# Patient Record
Sex: MALE | Race: WHITE | NOT HISPANIC OR LATINO | Employment: OTHER | URBAN - METROPOLITAN AREA
[De-identification: names, ages, dates, MRNs, and addresses within clinical notes are randomized per-mention and may not be internally consistent; named-entity substitution may affect disease eponyms.]

---

## 2019-02-06 ENCOUNTER — HOSPITAL ENCOUNTER (EMERGENCY)
Facility: CLINIC | Age: 69
Discharge: ANOTHER HOSPITAL | End: 2019-02-06
Attending: EMERGENCY MEDICINE | Admitting: EMERGENCY MEDICINE
Payer: MEDICARE

## 2019-02-06 ENCOUNTER — APPOINTMENT (OUTPATIENT)
Dept: CT IMAGING | Facility: CLINIC | Age: 69
End: 2019-02-06
Attending: EMERGENCY MEDICINE
Payer: MEDICARE

## 2019-02-06 ENCOUNTER — HOSPITAL ENCOUNTER (OUTPATIENT)
Facility: CLINIC | Age: 69
Setting detail: OBSERVATION
Discharge: HOME | End: 2019-02-07
Attending: EMERGENCY MEDICINE | Admitting: SURGERY
Payer: MEDICARE

## 2019-02-06 ENCOUNTER — APPOINTMENT (OUTPATIENT)
Dept: OCCUPATIONAL THERAPY | Facility: CLINIC | Age: 69
End: 2019-02-06
Attending: NURSE PRACTITIONER
Payer: MEDICARE

## 2019-02-06 VITALS
HEART RATE: 69 BPM | OXYGEN SATURATION: 93 % | TEMPERATURE: 98.6 F | DIASTOLIC BLOOD PRESSURE: 99 MMHG | SYSTOLIC BLOOD PRESSURE: 147 MMHG | RESPIRATION RATE: 187 BRPM

## 2019-02-06 DIAGNOSIS — M54.50 ACUTE MIDLINE LOW BACK PAIN WITHOUT SCIATICA: ICD-10-CM

## 2019-02-06 DIAGNOSIS — S22.069A CLOSED FRACTURE OF EIGHTH THORACIC VERTEBRA, UNSPECIFIED FRACTURE MORPHOLOGY, INITIAL ENCOUNTER (H): ICD-10-CM
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
## 2019-02-06 PROBLEM — M54.9 BACK PAIN: Status: ACTIVE | Noted: 2019-02-06

## 2019-02-06 LAB
ANION GAP SERPL CALCULATED.3IONS-SCNC: 4 MMOL/L (ref 3–14)
BUN SERPL-MCNC: 20 MG/DL (ref 7–30)
CALCIUM SERPL-MCNC: 8.5 MG/DL (ref 8.5–10.1)
CHLORIDE SERPL-SCNC: 110 MMOL/L (ref 94–109)
CO2 SERPL-SCNC: 29 MMOL/L (ref 20–32)
CREAT SERPL-MCNC: 0.9 MG/DL (ref 0.66–1.25)
ERYTHROCYTE [DISTWIDTH] IN BLOOD BY AUTOMATED COUNT: 14.2 % (ref 10–15)
GFR SERPL CREATININE-BSD FRML MDRD: 88 ML/MIN/{1.73_M2}
GLUCOSE SERPL-MCNC: 96 MG/DL (ref 70–99)
HCT VFR BLD AUTO: 43.4 % (ref 40–53)
HGB BLD-MCNC: 14.2 G/DL (ref 13.3–17.7)
MCH RBC QN AUTO: 33.6 PG (ref 26.5–33)
MCHC RBC AUTO-ENTMCNC: 32.7 G/DL (ref 31.5–36.5)
MCV RBC AUTO: 103 FL (ref 78–100)
PLATELET # BLD AUTO: 306 10E9/L (ref 150–450)
POTASSIUM SERPL-SCNC: 3.9 MMOL/L (ref 3.4–5.3)
RBC # BLD AUTO: 4.22 10E12/L (ref 4.4–5.9)
SODIUM SERPL-SCNC: 143 MMOL/L (ref 133–144)
WBC # BLD AUTO: 7.8 10E9/L (ref 4–11)

## 2019-02-06 PROCEDURE — 25000128 H RX IP 250 OP 636: Performed by: EMERGENCY MEDICINE

## 2019-02-06 PROCEDURE — 96376 TX/PRO/DX INJ SAME DRUG ADON: CPT

## 2019-02-06 PROCEDURE — 97535 SELF CARE MNGMENT TRAINING: CPT | Mod: GO,XU | Performed by: OCCUPATIONAL THERAPIST

## 2019-02-06 PROCEDURE — 72125 CT NECK SPINE W/O DYE: CPT

## 2019-02-06 PROCEDURE — 72128 CT CHEST SPINE W/O DYE: CPT

## 2019-02-06 PROCEDURE — 97530 THERAPEUTIC ACTIVITIES: CPT | Mod: GO | Performed by: OCCUPATIONAL THERAPIST

## 2019-02-06 PROCEDURE — 25000132 ZZH RX MED GY IP 250 OP 250 PS 637: Mod: GY | Performed by: NURSE PRACTITIONER

## 2019-02-06 PROCEDURE — A9270 NON-COVERED ITEM OR SERVICE: HCPCS | Mod: GY | Performed by: NURSE PRACTITIONER

## 2019-02-06 PROCEDURE — 99285 EMERGENCY DEPT VISIT HI MDM: CPT | Mod: 25

## 2019-02-06 PROCEDURE — G0378 HOSPITAL OBSERVATION PER HR: HCPCS

## 2019-02-06 PROCEDURE — 99285 EMERGENCY DEPT VISIT HI MDM: CPT | Mod: Z6 | Performed by: EMERGENCY MEDICINE

## 2019-02-06 PROCEDURE — A9270 NON-COVERED ITEM OR SERVICE: HCPCS | Mod: GY | Performed by: STUDENT IN AN ORGANIZED HEALTH CARE EDUCATION/TRAINING PROGRAM

## 2019-02-06 PROCEDURE — 99285 EMERGENCY DEPT VISIT HI MDM: CPT | Mod: 25 | Performed by: EMERGENCY MEDICINE

## 2019-02-06 PROCEDURE — 99222 1ST HOSP IP/OBS MODERATE 55: CPT | Performed by: NURSE PRACTITIONER

## 2019-02-06 PROCEDURE — 25000132 ZZH RX MED GY IP 250 OP 250 PS 637: Mod: GY | Performed by: STUDENT IN AN ORGANIZED HEALTH CARE EDUCATION/TRAINING PROGRAM

## 2019-02-06 PROCEDURE — 96375 TX/PRO/DX INJ NEW DRUG ADDON: CPT | Mod: 59

## 2019-02-06 PROCEDURE — 97165 OT EVAL LOW COMPLEX 30 MIN: CPT | Mod: GO | Performed by: OCCUPATIONAL THERAPIST

## 2019-02-06 PROCEDURE — 22310 CLOSED TX VERT FX W/O MANJ: CPT

## 2019-02-06 PROCEDURE — 85027 COMPLETE CBC AUTOMATED: CPT | Performed by: NURSE PRACTITIONER

## 2019-02-06 PROCEDURE — 40000133 ZZH STATISTIC OT WARD VISIT: Performed by: OCCUPATIONAL THERAPIST

## 2019-02-06 PROCEDURE — 96374 THER/PROPH/DIAG INJ IV PUSH: CPT | Performed by: EMERGENCY MEDICINE

## 2019-02-06 PROCEDURE — 72131 CT LUMBAR SPINE W/O DYE: CPT

## 2019-02-06 PROCEDURE — 96374 THER/PROPH/DIAG INJ IV PUSH: CPT | Mod: 59

## 2019-02-06 PROCEDURE — 80048 BASIC METABOLIC PNL TOTAL CA: CPT | Performed by: NURSE PRACTITIONER

## 2019-02-06 PROCEDURE — 68200002 ZZH TRAUMA EVALUATION W/O CC LEVEL II: Performed by: EMERGENCY MEDICINE

## 2019-02-06 RX ORDER — LIDOCAINE 4 G/G
2 PATCH TOPICAL
Status: DISCONTINUED | OUTPATIENT
Start: 2019-02-06 | End: 2019-02-07 | Stop reason: HOSPADM

## 2019-02-06 RX ORDER — PANTOPRAZOLE SODIUM 40 MG/1
40 TABLET, DELAYED RELEASE ORAL DAILY
Status: DISCONTINUED | OUTPATIENT
Start: 2019-02-06 | End: 2019-02-07 | Stop reason: HOSPADM

## 2019-02-06 RX ORDER — NALOXONE HYDROCHLORIDE 0.4 MG/ML
.1-.4 INJECTION, SOLUTION INTRAMUSCULAR; INTRAVENOUS; SUBCUTANEOUS
Status: DISCONTINUED | OUTPATIENT
Start: 2019-02-06 | End: 2019-02-07 | Stop reason: HOSPADM

## 2019-02-06 RX ORDER — LIDOCAINE 40 MG/G
CREAM TOPICAL
Status: DISCONTINUED | OUTPATIENT
Start: 2019-02-06 | End: 2019-02-07 | Stop reason: HOSPADM

## 2019-02-06 RX ORDER — PROPRANOLOL HYDROCHLORIDE 40 MG/1
40 TABLET ORAL DAILY
COMMUNITY
Start: 2017-09-29

## 2019-02-06 RX ORDER — ONDANSETRON 4 MG/1
4 TABLET, ORALLY DISINTEGRATING ORAL EVERY 6 HOURS PRN
Status: DISCONTINUED | OUTPATIENT
Start: 2019-02-06 | End: 2019-02-07 | Stop reason: HOSPADM

## 2019-02-06 RX ORDER — ACETAMINOPHEN 650 MG/1
650 SUPPOSITORY RECTAL EVERY 4 HOURS PRN
Status: DISCONTINUED | OUTPATIENT
Start: 2019-02-06 | End: 2019-02-06

## 2019-02-06 RX ORDER — PANTOPRAZOLE SODIUM 40 MG/1
40 TABLET, DELAYED RELEASE ORAL DAILY
Status: ON HOLD | COMMUNITY
Start: 2018-10-30 | End: 2019-12-13

## 2019-02-06 RX ORDER — SIMVASTATIN 40 MG
40 TABLET ORAL AT BEDTIME
Status: DISCONTINUED | OUTPATIENT
Start: 2019-02-06 | End: 2019-02-07 | Stop reason: HOSPADM

## 2019-02-06 RX ORDER — METHOCARBAMOL 500 MG/1
500 TABLET, FILM COATED ORAL EVERY 6 HOURS
Status: DISCONTINUED | OUTPATIENT
Start: 2019-02-06 | End: 2019-02-07

## 2019-02-06 RX ORDER — KETOROLAC TROMETHAMINE 15 MG/ML
15 INJECTION, SOLUTION INTRAMUSCULAR; INTRAVENOUS ONCE
Status: COMPLETED | OUTPATIENT
Start: 2019-02-06 | End: 2019-02-06

## 2019-02-06 RX ORDER — SIMVASTATIN 40 MG
40 TABLET ORAL AT BEDTIME
COMMUNITY
Start: 2017-09-29 | End: 2024-08-05

## 2019-02-06 RX ORDER — OXYCODONE HYDROCHLORIDE 5 MG/1
5-10 TABLET ORAL EVERY 4 HOURS PRN
Status: DISCONTINUED | OUTPATIENT
Start: 2019-02-06 | End: 2019-02-07 | Stop reason: HOSPADM

## 2019-02-06 RX ORDER — ONDANSETRON 2 MG/ML
4 INJECTION INTRAMUSCULAR; INTRAVENOUS EVERY 6 HOURS PRN
Status: DISCONTINUED | OUTPATIENT
Start: 2019-02-06 | End: 2019-02-07 | Stop reason: HOSPADM

## 2019-02-06 RX ORDER — FENTANYL CITRATE 50 UG/ML
100 INJECTION, SOLUTION INTRAMUSCULAR; INTRAVENOUS ONCE
Status: COMPLETED | OUTPATIENT
Start: 2019-02-06 | End: 2019-02-06

## 2019-02-06 RX ORDER — LORAZEPAM 2 MG/ML
1 INJECTION INTRAMUSCULAR ONCE
Status: COMPLETED | OUTPATIENT
Start: 2019-02-06 | End: 2019-02-06

## 2019-02-06 RX ORDER — ALLOPURINOL 300 MG/1
300 TABLET ORAL
COMMUNITY
Start: 2017-09-29 | End: 2019-02-06

## 2019-02-06 RX ORDER — ACETAMINOPHEN 325 MG/1
650 TABLET ORAL EVERY 4 HOURS PRN
Status: DISCONTINUED | OUTPATIENT
Start: 2019-02-06 | End: 2019-02-06

## 2019-02-06 RX ORDER — PROPRANOLOL HYDROCHLORIDE 10 MG/1
40 TABLET ORAL DAILY
Status: DISCONTINUED | OUTPATIENT
Start: 2019-02-06 | End: 2019-02-07 | Stop reason: HOSPADM

## 2019-02-06 RX ORDER — OXYCODONE HYDROCHLORIDE 5 MG/1
5 TABLET ORAL EVERY 4 HOURS PRN
Status: DISCONTINUED | OUTPATIENT
Start: 2019-02-06 | End: 2019-02-06

## 2019-02-06 RX ORDER — ACETAMINOPHEN 325 MG/1
975 TABLET ORAL EVERY 6 HOURS
Status: DISCONTINUED | OUTPATIENT
Start: 2019-02-06 | End: 2019-02-07 | Stop reason: HOSPADM

## 2019-02-06 RX ORDER — NAPROXEN SODIUM 220 MG
220 TABLET ORAL PRN
Status: ON HOLD | COMMUNITY
End: 2019-02-07

## 2019-02-06 RX ORDER — IBUPROFEN 200 MG
200 TABLET ORAL EVERY 6 HOURS PRN
COMMUNITY

## 2019-02-06 RX ADMIN — OXYCODONE HYDROCHLORIDE 5 MG: 5 TABLET ORAL at 23:28

## 2019-02-06 RX ADMIN — KETOROLAC TROMETHAMINE 15 MG: 15 INJECTION, SOLUTION INTRAMUSCULAR; INTRAVENOUS at 10:45

## 2019-02-06 RX ADMIN — LORAZEPAM 1 MG: 2 INJECTION INTRAMUSCULAR; INTRAVENOUS at 02:17

## 2019-02-06 RX ADMIN — OXYCODONE HYDROCHLORIDE 5 MG: 5 TABLET ORAL at 12:47

## 2019-02-06 RX ADMIN — HYDROMORPHONE HYDROCHLORIDE 1 MG: 1 INJECTION, SOLUTION INTRAMUSCULAR; INTRAVENOUS; SUBCUTANEOUS at 05:32

## 2019-02-06 RX ADMIN — HYDROMORPHONE HYDROCHLORIDE 1 MG: 1 INJECTION, SOLUTION INTRAMUSCULAR; INTRAVENOUS; SUBCUTANEOUS at 04:34

## 2019-02-06 RX ADMIN — OXYCODONE HYDROCHLORIDE 5 MG: 5 TABLET ORAL at 17:08

## 2019-02-06 RX ADMIN — OXYCODONE HYDROCHLORIDE 5 MG: 5 TABLET ORAL at 21:55

## 2019-02-06 RX ADMIN — METHOCARBAMOL 500 MG: 500 TABLET, FILM COATED ORAL at 17:08

## 2019-02-06 RX ADMIN — PANTOPRAZOLE SODIUM 40 MG: 40 TABLET, DELAYED RELEASE ORAL at 14:06

## 2019-02-06 RX ADMIN — METHOCARBAMOL 500 MG: 500 TABLET, FILM COATED ORAL at 11:55

## 2019-02-06 RX ADMIN — ACETAMINOPHEN 975 MG: 325 TABLET, FILM COATED ORAL at 17:08

## 2019-02-06 RX ADMIN — LIDOCAINE 2 PATCH: 560 PATCH PERCUTANEOUS; TOPICAL; TRANSDERMAL at 11:56

## 2019-02-06 RX ADMIN — ACETAMINOPHEN 975 MG: 325 TABLET, FILM COATED ORAL at 23:28

## 2019-02-06 RX ADMIN — PROPRANOLOL HYDROCHLORIDE 40 MG: 10 TABLET ORAL at 14:06

## 2019-02-06 RX ADMIN — FENTANYL CITRATE 100 MCG: 50 INJECTION, SOLUTION INTRAMUSCULAR; INTRAVENOUS at 05:53

## 2019-02-06 RX ADMIN — HYDROMORPHONE HYDROCHLORIDE 1 MG: 1 INJECTION, SOLUTION INTRAMUSCULAR; INTRAVENOUS; SUBCUTANEOUS at 00:53

## 2019-02-06 RX ADMIN — ACETAMINOPHEN 975 MG: 325 TABLET, FILM COATED ORAL at 11:54

## 2019-02-06 RX ADMIN — METHOCARBAMOL 500 MG: 500 TABLET, FILM COATED ORAL at 23:28

## 2019-02-06 RX ADMIN — SIMVASTATIN 40 MG: 40 TABLET, FILM COATED ORAL at 21:55

## 2019-02-06 ASSESSMENT — ENCOUNTER SYMPTOMS
CONFUSION: 0
HEADACHES: 0
BACK PAIN: 1
ABDOMINAL PAIN: 0
NECK PAIN: 1
NECK STIFFNESS: 0
DIFFICULTY URINATING: 0
VOMITING: 0
ARTHRALGIAS: 0
NAUSEA: 0
FEVER: 0
BACK PAIN: 1
EYE REDNESS: 0
COLOR CHANGE: 0
SHORTNESS OF BREATH: 0
NECK PAIN: 1

## 2019-02-06 ASSESSMENT — MIFFLIN-ST. JEOR: SCORE: 1763.29

## 2019-02-06 NOTE — PHARMACY-ADMISSION MEDICATION HISTORY
"Admission medication history interview status for the 2/6/2019 admission is complete. See Epic admission navigator for allergy information, pharmacy, prior to admission medications and immunization status.     Medication history interview sources:  Patient, Care Everywhere    Changes made to PTA medication list (reason)  Added: none  Deleted: allopurinol 300 mg tablets (patient not taking), lansoprazole PO (patient reports he is taking pantoprazole), phenol 1.4% spray (patient not using), duplicate propranolol order, duplicate simvastatin order  Changed: added frequency of \"daily\" to pantoprazole 40 mg tablets and propranolol 40 mg tablets, added frequency of \"at bedtime\" to simvastatin 40 mg tablets    Additional medication history information (including reliability of information, actions taken by pharmacist): Patient was a reliable historian. Reports that he is not taking any OTC, supplements/vitamins, or any other prescription medications than those listed below.     Patient states that he takes propranolol for tremor    Reports taking about 6 tablets of ibuprofen and 2 tablets of Aleve prior to arriving at [facility] ED. Normally takes as needed.     Prior to Admission medications    Medication Sig Last Dose Taking? Auth Provider   ibuprofen (ADVIL/MOTRIN) 200 MG tablet Take 200 mg by mouth every 6 hours as needed for mild pain 2/5/2019 at AM Yes Unknown, Entered By History   naproxen sodium (ANAPROX) 220 MG tablet Take 220 mg by mouth as needed for moderate pain 2/5/2019 at AM Yes Unknown, Entered By History   pantoprazole (PROTONIX) 40 MG EC tablet Take 40 mg by mouth daily  2/5/2019 at AM Yes Reported, Patient   propranolol (INDERAL) 40 MG tablet Take 40 mg by mouth daily  2/5/2019 at AM Yes Reported, Patient   simvastatin (ZOCOR) 40 MG tablet Take 40 mg by mouth At Bedtime  2/4/2019 at PM  Reported, Patient       Medication history completed by: Ronel Verma, PharmD    "

## 2019-02-06 NOTE — ED PROVIDER NOTES
History     Chief Complaint:  Fall    The history is provided by the patient.      Olayinka Baltazar is a 68 year old male with history of hyperlipidemia and spinal stenosis s/p lumbar laminectomy L3-4 who presents with back pain after slipping on ice about 10 hours ago. The patient states that when he slipped and fell, he landed on his back and hit hit head and neck on the ground as well. He denies LOC. He is not on blood thinners.  Since then, the patient reports he has been having extreme pain in his lower-mid back that exacerbates with movement. He states that the sides of his neck are tender as well, but denies midline cervical tenderness. He denies numbness or weakness to the extremities, and is able to ambulate.   The patient presented to clinic, where a neck Xray was obtained, and then he was prompted to present to the ED. He states that he took 2 aleve and 6 ibuprofen prior to arrival.    Allergies:  No known drug allergies      Medications:    Lansoprazole  Propranolol  Simvastatin     Past Medical History:    Tremors  Hyperlipidemia     Past Surgical History:    Splenectomy  Tonsillectomy  Orthopedic surgery     Family History:    CAD    Social History:  The patient is accompanied to the ED by wife  PCP: [facility]   Marital Status:    Smoking status: current every day smoker  Alcohol use: No- quit 2009      Review of Systems   Cardiovascular: Negative for chest pain.   Gastrointestinal: Negative for nausea and vomiting.   Musculoskeletal: Positive for back pain and neck pain.   All other systems reviewed and are negative.      Physical Exam     Patient Vitals for the past 24 hrs:   BP Temp Temp src Pulse Heart Rate Resp SpO2   02/06/19 0445 (!) 137/91 -- -- 64 -- -- 95 %   02/06/19 0430 (!) 146/122 -- -- 71 -- -- 96 %   02/06/19 0415 144/90 -- -- 62 -- -- 95 %   02/06/19 0400 (!) 157/100 -- -- 63 -- -- 95 %   02/06/19 0300 145/85 -- -- 61 -- -- 96 %   02/06/19 0200 143/87 -- --  60 -- -- 94 %   02/06/19 0122 -- 98.6  F (37  C) Oral -- -- -- --   02/06/19 0100 147/86 -- -- 79 -- -- 93 %   02/06/19 0039 (!) 146/97 -- -- -- 70 (!) 187 96 %        Physical Exam  Constitutional: vitals reviewed  Eyes: EOMI  ENT: No hemotympanum, dental trauma, or oropharyngeal lacerations  Respiratory: Normal effort, symmetric chest rise, breath sounds equal bilaterally  Cardiovascular: Distal pulses palpable and symmetric, distal extremities warm, heart rate normal and rhythm regular  Gastrointestinal: No tenderness to palpation or guarding  MSK: No tenderness of facial bones, no step offs of scalp, no chest wall tenderness or crepitus, no long bone deformities or tenderness, full ROM of all extremities.  Cervical collar is in place.  There is paraspinous tenderness of the cervical spine and thoracic spine.  Some midline tenderness of the low to mid thoracic spine without obvious step-offs.  Skin: No diaphoresis, lacerations, or lesions  Neurologic: Alert and oriented, follows commands in all extremities, sensation to light touch intact in all extremities, full strength and coordination        Emergency Department Course     Imaging:  Radiographic findings were communicated with the patient who voiced understanding of the findings.    Lumbar spine CT w/o contrast  Impression: No acute fracture or malalignment.  As read by Radiology.     Cervical spine CT w/o contrast  Impression: No acute traumatic abnormality.  As read by Radiology.     CT Thoracic Spine w/o contrast  Impression:   1. There are bridging osteophytes from T2 through L1  2. At the T8 level, there is a linear lucency through a ventral osteophyte with slight extension into the anterior aspect of the T8 vertebral body. This is compatible with age-indeterminate fracture.  As read by Radiology.     Interventions:  0053: dilaudid 1 mg, IV  0217: ativan 1 mg, IV  0434: dilaudid 1 mg, IV    Emergency Department Course:  Past medical records, nursing  notes, and vitals reviewed.  0041: I performed an exam of the patient and obtained history, as documented above.  The patient was sent for CT scan while in the emergency department, findings above.       0428: I rechecked the patient. Explained findings to the patient.     Findings and plan explained to the Patient.    Patient will be transferred to [facility] via EMS. Discussed the case with Dr. Avalos, who will admit the patient to a monitored bed for further monitoring, evaluation, and treatment.      Impression & Plan      Medical Decision Making:  Patient who presents with mechanical fall on ice backwards with acute back pain.  He was sent by his clinic with concerns for potential cervical spine injury in a cervical collar.  On my exam, I am more concerned for thoracic spine injury.  CT scans were done of his entire spinal column.  This demonstrates a possible cortical deformity of T8.  Distally he is neurologically intact.  He was maintained on bedrest while in the emergency department.  His cervical spine collar was cleared.  He was given multiple doses of IV pain medications as well as Ativan for pain control.  His high requirement for IV opioids will necessitate admission to hospital.  He will be transferred to the [facility] for an urgent surgical consultation and further pain control.  He is agreeable to the disposition.  He was transferred by BLS and left the emergency department in stable condition.  Critical Care time:  none    Diagnosis:    ICD-10-CM    1. Closed fracture of eighth thoracic vertebra, unspecified fracture morphology, initial encounter (H) S22.069A        Disposition:  Transferred to [facility]    I, Megan Beh, am serving as a scribe at 12:41 AM on 2/6/2019 to document services personally performed by Mikal Jackman MD based on my observations and the provider's statements to me.      Megan Beh  2/6/2019   [facility] EMERGENCY DEPARTMENT       Mikal Jackman MD  02/06/19 0600

## 2019-02-06 NOTE — PLAN OF CARE
"/85   Pulse 75   Temp 97.8  F (36.6  C) (Oral)   Resp 16   Ht 1.854 m (6' 1\")   Wt 93.4 kg (206 lb)   SpO2 95%   BMI 27.18 kg/m      Status: s/p T8 fracture after falling on ice 2/5  Vs: VSS on RA, cont pulse ox   Neuros: A&Ox4. Neuros intact. Strength 5/5  GI: Voids spontaneously w/o difficulty  : Regular diet. +BS. Last BM 2/5   Iv: L PIV SL  Activity: Up w/ SBA. Ambulated halls with OT  Pain: Mid-lower back pain managed w/ PRN oxycodone 5 mg w/ minimal relief.   Respiratory: LS clear & equal bilat.   Skin: Skin intact. Lidocaine patches in place. Full skin assessment not yet completed, please assess.   Social: Wife and daughter supportive at bedside  Plan of care: Continue to monitor, manage pain & follow POC.       "

## 2019-02-06 NOTE — ED TRIAGE NOTES
Alert and oriented ambulating , fell at 230 this pm fell , fell on his back , c/o mid back neck pain c collar on from [facility]

## 2019-02-06 NOTE — ED NOTES
Pt placed on 2LPM of supplemental oxygen related to sats dropping to 86%-88% on room air while sleeping.

## 2019-02-06 NOTE — PLAN OF CARE
Discharge Planner OT   Patient plan for discharge: home  Current status: Pt required Min A for bed mobility, Max A for LE dressing, but SBA or better for walking, toilet tx and stairs. VSS while on RA  Barriers to return to prior living situation: spinal precautions, pain  Recommendations for discharge: home with A from family prn.  Rationale for recommendations: anticipate pt will be able to complete ADLs safely with A from family prn.       Entered by: Mehran Naranjo 02/06/2019 2:43 PM

## 2019-02-06 NOTE — CONSULTS
[facility]  NEUROSURGERY CONSULTATION NOTE    This consultation was requested by Dr. Brown from the Emergency Medicine service.    Reason for Consultation: T8 Vertebral Fracture    HPI: Mr. Baltazar is a a very pleasant 68 year old male patient who was transferred from [facility] to the [facility] for Neurosurgical Evaluation of a T8 Vertebral Body Fracture. The patient reports that at about 2:30PM yesterday he suffered a fall on his driveway while taking out the trash. He states that he slipped and fell on his back and believes he hit his head as well. He reports no loss of consciousness. Immediately after the fall, the patient reports that he had a headache and neck pain that travelled down to his mid back. He was able to get up off the ground and walk inside his house. He states that he was initially reluctant to be evaluated by a medical professional, but as his back pain gradually worsened over the next 2 hours, his wife encouraged him to be seen. Initially, he went to the [facility] but was referred to [facility] for diagnostic imaging of his spine. He was evaluated with a CT of the Cervical, Thoracic and Lumbar Spine. The imaging revealed a fracture through the anterior aspect of the T8 Vertebral Body as well as an associated anterior osteophyte complex. Due to these findings, the patient was transferred to the [facility] for Neurosurgical and Trauma Evaluation.     Presently, the patient reports that he has midline mid back pain that he rates 4/10 on the VAS. His pain is exacerbated by movement. He denies any pain in his upper or lower extremities. He denies any numbness or tingling in his upper or lower extremities. He denies any weakness in his upper or lower extremities. He denies any loss of bowel or bladder control. He denies any gait instability.     The patient  "takes Aspirin 81 mg for primary cardiac disease prevention. He is otherwise on no anti-platelet or anti-coagulant medications. He is currently NPO.     The patient's medical history is significant for Hypertension, Hyperlipidemia, Gastro-Esophageal Reflux Disease, and Tremor. He is a retired . He currently smokes 8-10 cigarettes per day. 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     Tremors        PAST SURGICAL HISTORY:   Past Surgical History:   Procedure Laterality Date     ESOPHAGOSCOPY, GASTROSCOPY, DUODENOSCOPY (EGD), COMBINED N/A 9/23/2015    Procedure: COMBINED ESOPHAGOSCOPY, GASTROSCOPY, DUODENOSCOPY (EGD), BIOPSY SINGLE OR MULTIPLE;  Surgeon: Celestino Shannon MD;  Location:  GI     MOUTH SURGERY  2004    removal mouth ca     ORTHOPEDIC SURGERY       SPLENECTOMY  1966     TONSILLECTOMY  1979       FAMILY HISTORY:   Family History   Problem Relation Age of Onset     Coronary Artery Disease Mother      Coronary Artery Disease Brother      Colon Cancer No family hx of        SOCIAL HISTORY:   Social History     Tobacco Use     Smoking status: Current Every Day Smoker     Packs/day: 0.50     Years: 40.00     Pack years: 20.00     Types: Cigarettes   Substance Use Topics     Alcohol use: No     Comment: 3/9/2009 - quit       MEDICATIONS:  Current Outpatient Medications   Medication Sig Dispense Refill     allopurinol (ZYLOPRIM) 300 MG tablet Take 300 mg by mouth       LANSOPRAZOLE PO Take 30 mg by mouth 2 times daily.       pantoprazole (PROTONIX) 40 MG EC tablet Take 40 mg by mouth       phenol (CHLORASEPTIC) 1.4 % spray Apply 2 sprays topically       propranolol (INDERAL) 40 MG tablet Take 40 mg by mouth       PROPRANOLOL HCL PO Take 60 mg by mouth 2 times daily.       simvastatin (ZOCOR) 40 MG tablet Take 40 mg by mouth       SIMVASTATIN PO Take 40 mg by mouth daily.   " "    Allergies:  Allergies   Allergen Reactions     Food Anaphylaxis     ALMONDS       ROS: 10 point ROS of systems including Constitutional, Eyes, Respiratory, Cardiovascular, Gastroenterology, Genitourinary, Integumentary, Muscularskeletal, Psychiatric were all negative except for pertinent positives noted in my HPI.    EXAM:  /74   Pulse 62   Temp 97.8  F (36.6  C) (Oral)   Resp 14   Ht 1.854 m (6' 1\")   Wt 93.4 kg (206 lb)   SpO2 97%   BMI 27.18 kg/m      General Appearance: Lying on Stretcher; Appears Well-Nourished and Well-Groomed; Mildly Distressed during periods of movement  Mental Status: Alert; Oriented to Person, Place, Time and Situation  Cranial Nerves: II-XII Grossly Intact Bilaterally  Speech: Fluent; No Dysarthria or Aphasia  Motor: 5/5 in Bilateral Upper and Lower Extremities  Pronator Drift: Absent  Sensory: Intact to Light Touch in Bilateral Upper and Lower Extremities    Diagnostic Imaging:   CT Cervical Spine: No Fracture or Traumatic Subluxation; Multilevel Spondylosis    CT Thoracic Spine: Fracture through the anterior T8 Vertebral Body and Osteophyte Complex; No loss of height of the Vertebral Body; No retropulsion of components into the Spinal Canal    CT Lumbar Spine: No Fracture or Traumatic Subluxation    Assessment: Mr. Baltazar is a 68 year old man with an Acute Fracture of the T8 Anterior Vertebral Body and Anterior Osteophyte Complex 2/2 to Mechanical Fall; Neurologically Intact.     RECOMMENDATIONS:  - No Neurosurgical Intervention Indicated at this time  - No Follow-up Imaging Indicated  - No Need for TLSO Brace given preservation of vertebral body height and that fracture is stable D/T presence of anterior osteophyte complex  - Pain Management per Primary Service  - No Need for Neurosurgical Follow-up    The patient's was discussed and imaging was reviewed with staff Neurosurgeon, Dr. Shay Gaitan, who is in agreement with the plan of care as outlined above. "     Dorinda aVnce, MSN, [facility]  Department of Neurosurgery  Pager: [phone]

## 2019-02-06 NOTE — H&P
[facility]    History and Physical / Consult note: Trauma Service     Date of Admission:  2/6/2019    Time of Admission/Consult Request (page/call): 02/06/19 @ 0800    Time of my evaluation: 10am  Consulting services:  Neurosurgery - Non-emergent consult: Called by ED    Assessment & Plan   Trauma mechanism:fall from standing- tripped on icy surface  Time/date of injury:02/05 19 pm  Known Injuries:  1. Acute T8 fracture  Other diagnoses:   1. Acute traumatic pain  2. Hx L3-4 laminectomy    Procedure: None    Plan:  1. Admit to trauma obs, or pain control, PT/OT  2. Neurosurgery- consulted- no acute interventions, bracing or follow up recommended.  3. PT/OT eval in am  4. OK for diet  5. OK to resume home meds    Code status: Full confirmed with patient.     General Cares:  GI Prophylaxis: PTA PPI  DVT Prophylaxis: Mechanical  Date of last stool/Bowel Regimen:PTA/ordered  Pulmonary toilet:Cough deep breath/IS    ETOH: This patient was asked if in the last 3-6 months there has been a time when he had  5 or more drinks in a single day/outing.. Patient answer to the screening question was in the negative. No intervention needed.  Primary Care Physician   [facility]    Chief Complaint   Back pain    History is obtained from the patient, electronic health record and emergency department physician    History of Present Illness   Olayinka Baltazar is a 68 year old male with a history of spinal stenosis who is status post lumbar laminectomy at L3-L4 approximately 12 years ago who presents for evaluation of back pain.  Patient states that he was in his usual state of health until approximately 230 yesterday or 17 hours ago when he slipped and fell landing on his lower and mid back.  He states that he instantly had pain and began taking ibuprofen without relief.  He went to his clinic and was later transferred to the emergency room at [facility].  Workup there included  CT of the cervical, thoracic and lumbar spine revealing possible T8 vertebral body fracture.  He was treated with Dilaudid and Ativan but continued to have extreme pain in his lower mid back that was worse with any type of movement.  As well he had bilateral neck tenderness.  He states that he hit his head but had no loss of consciousness nor is he on any type of blood thinners.  He presents here for further evaluation and pain control. He denies numbness or tingling, any lower or upper extremity pain.    Past Medical History    I have reviewed this patient's medical history and updated it with pertinent information if needed.   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  Past Surgical History:   Procedure Laterality Date     ESOPHAGOSCOPY, GASTROSCOPY, DUODENOSCOPY (EGD), COMBINED N/A 9/23/2015    Procedure: COMBINED ESOPHAGOSCOPY, GASTROSCOPY, DUODENOSCOPY (EGD), BIOPSY SINGLE OR MULTIPLE;  Surgeon: Celestino Shannon MD;  Location:  GI     MOUTH SURGERY  2004    removal mouth ca     ORTHOPEDIC SURGERY       SPLENECTOMY  1966     TONSILLECTOMY  1979     Prior to Admission Medications   Prior to Admission Medications   Prescriptions Last Dose Informant Patient Reported? Taking?   pantoprazole (PROTONIX) 40 MG EC tablet 2/5/2019 at AM  Yes Yes   Sig: Take 40 mg by mouth daily    propranolol (INDERAL) 40 MG tablet 2/5/2019 at AM  Yes Yes   Sig: Take 40 mg by mouth daily    simvastatin (ZOCOR) 40 MG tablet 2/4/2019 at PM  Yes No   Sig: Take 40 mg by mouth At Bedtime       Facility-Administered Medications: None     Allergies   Allergies   Allergen Reactions     Food Anaphylaxis     ALMONDS       Social History   Social History     Socioeconomic History     Marital status:      Spouse name: Not on file     Number of children: Not on  file     Years of education: Not on file     Highest education level: Not on file   Social Needs     Financial resource strain: Not on file     Food insecurity - worry: Not on file     Food insecurity - inability: Not on file     Transportation needs - medical: Not on file     Transportation needs - non-medical: Not on file   Occupational History     Not on file   Tobacco Use     Smoking status: Current Every Day Smoker     Packs/day: 0.50     Years: 40.00     Pack years: 20.00     Types: Cigarettes   Substance and Sexual Activity     Alcohol use: No     Comment: 3/9/2009 - quit     Drug use: No     Sexual activity: Not on file   Other Topics Concern     Parent/sibling w/ CABG, MI or angioplasty before 65F 55M? Not Asked   Social History Narrative     Not on file       Family History   Family history reviewed with patient and is noncontributory.    Review of Systems   CONSTITUTIONAL: No fever, chills, sweats, fatigue   EYES: no visual blurring, no double vision or visual loss  ENT: no decrease in hearing, no tinnitus, no vertigo, no hoarseness  RESPIRATORY: no shortness of breath, no cough, no sputum   CARDIOVASCULAR: no palpitations, no chest  pain, no exertional chest pain or pressure  GASTROINTESTINAL: no nausea or vomiting, or abd pain  GENITOURINARY: no dysuria, no frequency or hesitancy, no hematuria  MUSCULOSKELETAL: no weakness, no redness, no swelling, no joint pain,   SKIN: no rashes, ecchymoses, abrasions or lacerations  NEUROLOGIC: no numbness or tingling of hands, no numbness or tingling  of feet, no syncope, no tremors or weakness  PSYCHIATRIC: no sleep disturbances, no anxiety or depression    Physical Exam   Temp: 97.8  F (36.6  C) Temp src: Oral BP: 136/82 Pulse: 60   Resp: 18 SpO2: 95 % O2 Device: Nasal cannula Oxygen Delivery: 2 LPM  Vital Signs with Ranges  Temp:  [97.8  F (36.6  C)-98.6  F (37  C)] 97.8  F (36.6  C)  Pulse:  [60-79] 60  Heart Rate:  [70] 70  Resp:  [] 18  BP:  (133-157)/() 136/82  SpO2:  [85 %-97 %] 95 % 206 lbs 0 oz    Primary Survey:  Airway: patient talking  Breathing: symmetric respiratory effort bilaterally  Circulation: central pulses present and peripheral pulses present  Disability: Pupils - left 4 mm and brisk, right 4 mm and brisk     [place] Coma Scale - Total 15/15  Eye Response (E): 4  4= spontaneous,  3= to verbal/voice, 2=  to pain, 1= No response   Verbal Response (V): 5   5= Orientated, converses,  4= Confused, converses, 3= Inappropriate words,  2= Incomprehensible sounds,  1=No response   Motor Response (M): 6   6= Obeys commands, 5= Localizes to pain, 4= Withdrawal to pain, 3=Fexion to pain, 2= Extension to pain, 1= No response    Secondary Survey:  General: alert, oriented to person, place, time  Neuro: PERRLA. EOMI. CN II-XII grossly intact. No focal deficits. Strength 5/5 x 4 extremities.  Sensation intact.  Head: atraumatic, normocephalic, trachea midline  Eyes:  Pupils 2mm, EOMI, corneas and conjunctivae clear  Ears: pearly grey bilateral TMs and non-inflamed external ear canals  Nose: nares patent, no drainage, nasal septum non-tender  Mouth/Throat: no exudates or erythema,  no dental tenderness or malocclusions, no tongue lacerations  Neck:  no cervical collar present. No midline posterior tenderness, full AROM without pain.   Chest/Pulmonary: normal respiratory rate and rhythm,  bilateral clear breath sounds, no wheezes, rales or rhonchi, no chest wall tenderness or deformities,   Cardiovascular: S1, S2,  normal and regular rate and rhythm, no murmurs  Abdomen: soft, non-tender, no guarding, no rebound tenderness and no tenderness to palpation  : normal external genitalia, pelvis stable to lateral compression, no urine assess/urine yellow and clear  Musculoskel/Extremities: normal extremities, full AROM of major joints without tenderness, edema, erythema, ecchymosis, or abrasions. +2 PP. no edema.   Back/Spine: no deformity, no midline  tenderness, no sacral tenderness, no step-offs and no abrasions or contusions  Hands: no gross deformities of hands or fingers. Full AROM of hand and fingers in flexion and extension.  strength equal and symmetric.   Psychiatric: affect/mood normal, cooperative, normal judgement/insight and memory intact  Skin: no rashes, laceration, ecchymosis, skin warm and dry.     Results for orders placed or performed during the hospital encounter of 02/06/19 (from the past 24 hour(s))   Basic metabolic panel   Result Value Ref Range    Sodium 143 133 - 144 mmol/L    Potassium 3.9 3.4 - 5.3 mmol/L    Chloride 110 (H) 94 - 109 mmol/L    Carbon Dioxide 29 20 - 32 mmol/L    Anion Gap 4 3 - 14 mmol/L    Glucose 96 70 - 99 mg/dL    Urea Nitrogen 20 7 - 30 mg/dL    Creatinine 0.90 0.66 - 1.25 mg/dL    GFR Estimate 88 >60 mL/min/[1.73_m2]    GFR Estimate If Black >90 >60 mL/min/[1.73_m2]    Calcium 8.5 8.5 - 10.1 mg/dL   CBC with platelets   Result Value Ref Range    WBC 7.8 4.0 - 11.0 10e9/L    RBC Count 4.22 (L) 4.4 - 5.9 10e12/L    Hemoglobin 14.2 13.3 - 17.7 g/dL    Hematocrit 43.4 40.0 - 53.0 %     (H) 78 - 100 fl    MCH 33.6 (H) 26.5 - 33.0 pg    MCHC 32.7 31.5 - 36.5 g/dL    RDW 14.2 10.0 - 15.0 %    Platelet Count 306 150 - 450 10e9/L       Studies:  No orders to display       Sundeep Vicente trauma CNP

## 2019-02-06 NOTE — PROGRESS NOTES
02/06/19 1342   Quick Adds   Type of Visit Initial Occupational Therapy Evaluation   Living Environment   Lives With spouse   Living Arrangements ([facility])   Home Accessibility stairs to enter home;stairs within home   Number of Stairs, Main Entrance two   Stair Railings, Main Entrance railing on right side (ascending)   Number of Stairs, Within Home, Primary (13)   Stair Railings, Within Home, Primary railing on right side (ascending)   Transportation Anticipated car, drives self;family or friend will provide   Living Environment Comment family is around to A 75% of the time   Self-Care   Usual Activity Tolerance excellent   Current Activity Tolerance good   Regular Exercise No   Equipment Currently Used at Home none   Functional Level   Ambulation 0-->independent   Transferring 0-->independent   Toileting 0-->independent   Bathing 0-->independent   Dressing 0-->independent   Eating 0-->independent   Communication 0-->understands/communicates without difficulty   Swallowing 0-->swallows foods/liquids without difficulty   Cognition 0 - no cognition issues reported   Fall history within last six months yes   Number of times patient has fallen within last six months 1   Which of the above functional risks had a recent onset or change? none   General Information   Onset of Illness/Injury or Date of Surgery - Date 02/06/19   Referring Physician Sundeep Vicente APRN CNP   Patient/Family Goals Statement to return home Ind   Additional Occupational Profile Info/Pertinent History of Current Problem Olayinka Baltazar is a 68 year old male with a history of spinal stenosis who is status post lumbar laminectomy at L3-L4 approximately 12 years ago who presents for evaluation of back pain.  Patient states that he was in his usual state of health until approximately 230 yesterday or 17 hours ago when he slipped and fell landing on his lower and mid back.  He states that he instantly had pain and began taking ibuprofen  without relief.  He went to his clinic and was later transferred to the emergency room at [facility].  Workup there included CT of the cervical, thoracic and lumbar spine revealing possible T8 vertebral body fracture.  He was treated with Dilaudid and Ativan but continued to have extreme pain in his lower mid back that was worse with any type of movement.  As well he had bilateral neck tenderness.  He states that he hit his head but had no loss of consciousness nor is he on any type of blood thinners.  He presents here for further evaluation and pain control. He denies numbness or tingling, any lower or upper extremity pain.   Precautions/Limitations spinal precautions   Cognitive Status Examination   Orientation orientation to person, place and time   Level of Consciousness alert   Visual Perception   Visual Perception No deficits were identified   Sensory Examination   Sensory Comments no changes   Pain Assessment   Patient Currently in Pain Yes, see Vital Sign flowsheet   Integumentary/Edema   Integumentary/Edema no deficits were identifed   Range of Motion (ROM)   ROM Comment no changes, limitations on shoulder abduction   Strength   Strength Comments NT 2/2 spinal precautions   Muscle Tone Assessment   Muscle Tone Comments WNL   Coordination   Upper Extremity Coordination No deficits were identified   Mobility   Bed Mobility Comments Min A   Transfer Skill: Sit to Stand   Level of Eek: Sit/Stand stand-by assist   Upper Body Dressing   Level of Eek: Dress Upper Body stand-by assist   Lower Body Dressing   Level of Eek: Dress Lower Body maximum assist (25% patients effort)   Toileting   Level of Eek: Toilet stand-by assist   Instrumental Activities of Daily Living (IADL)   Previous Responsibilities (Pt was Ind, has from family)   Activities of Daily Living Analysis   Impairments Contributing to Impaired Activities of Daily Living pain  (spinal precautions)   General  "Therapy Interventions   Planned Therapy Interventions ADL retraining;progressive activity/exercise;home program guidelines   Clinical Impression   Criteria for Skilled Therapeutic Interventions Met yes, treatment indicated   OT Diagnosis decreased ADL I and tolerance   Influenced by the following impairments pain, spinal precautions   Assessment of Occupational Performance 1-3 Performance Deficits   Identified Performance Deficits home mgmt, dressing, leisure   Clinical Decision Making (Complexity) Low complexity   Therapy Frequency daily   Predicted Duration of Therapy Intervention (days/wks) 2/7/19   Anticipated Equipment Needs at Discharge (TBD)   Anticipated Discharge Disposition Home with Assist  (A from family prn)   Risks and Benefits of Treatment have been explained. Yes   Patient, Family & other staff in agreement with plan of care Yes   Clinical Impression Comments Pt may bnenefit from skilled OT to help increase ADL I and tolerance after spinal fx   [facility] [employer] TM \"6 Clicks\"   2016, Trustees of [facility], under license to [employer].  All rights reserved.   6 Clicks Short Forms Daily Activity Inpatient Short Form   [facility] AM-PAC  \"6 Clicks\" Daily Activity Inpatient Short Form   1. Putting on and taking off regular lower body clothing? 2 - A Lot   2. Bathing (including washing, rinsing, drying)? 3 - A Little   3. Toileting, which includes using toilet, bedpan or urinal? 4 - None   4. Putting on and taking off regular upper body clothing? 4 - None   5. Taking care of personal grooming such as brushing teeth? 4 - None   6. Eating meals? 4 - None   Daily Activity Raw Score (Score out of 24.Lower scores equate to lower levels of function) 21   Total Evaluation Time   Total Evaluation Time (Minutes) 10     "

## 2019-02-06 NOTE — ED TRIAGE NOTES
Patient sent from [facility] with a T8 fracture.  Fell yesterday at 1430 falling back on the ice.  Was given Fentanyl 100mcg at 550 and third dose of 1mg Dilaudid at 530. Patient A&O 4x; able to wiggle toes and move self from stretcher to bed.  IV access in the right AC

## 2019-02-06 NOTE — ED PROVIDER NOTES
History     Chief Complaint   Patient presents with     Back Injury     HPI  Olayinka Baltazar is a 68 year old male with a history of spinal stenosis who is status post lumbar laminectomy at L3-L4 approximately 12 years ago who presents for evaluation of back pain.  Patient states that he was in his usual state of health until approximately 230 yesterday or 17 hours ago when he slipped and fell landing on his lower and mid back.  He states that he instantly had pain and began taking ibuprofen without relief.  He went to his clinic and was later transferred to the emergency room at [facility].  Workup there included CT of the cervical, thoracic and lumbar spine revealing possible T8 vertebral body fracture.  He was treated with Dilaudid and Ativan but continued to have extreme pain in his lower mid back that was worse with any type of movement.  As well he had bilateral neck tenderness.  He states that he hit his head but had no loss of consciousness nor is he on any type of blood thinners.  He presents here for further evaluation and pain control.  He notes that his pain is severe, nonradiating and different than his more chronic lower back pain.  He currently denies bowel or bladder dysfunction but states he has not urinated since his injury.             I have reviewed the Medications, Allergies, Past Medical and Surgical History, and Social History in the Epic system.    Review of Systems   Constitutional: Negative for fever.   HENT: Negative for congestion.    Eyes: Negative for redness.   Respiratory: Negative for shortness of breath.    Cardiovascular: Negative for chest pain.   Gastrointestinal: Negative for abdominal pain.   Genitourinary: Negative for difficulty urinating.   Musculoskeletal: Positive for back pain and neck pain. Negative for arthralgias and neck stiffness.   Skin: Negative for color change.   Neurological: Negative for headaches.   Psychiatric/Behavioral: Negative for confusion.  "      Physical Exam   BP: (!) 147/95  Pulse: 69  Temp: 97.8  F (36.6  C)  Resp: 18  Height: 185.4 cm (6' 1\")  Weight: 93.4 kg (206 lb)  SpO2: 97 %      Physical Exam   Constitutional: He is oriented to person, place, and time. No distress.   HENT:   Head: Atraumatic.   Mouth/Throat: Oropharynx is clear and moist.   Eyes: Pupils are equal, round, and reactive to light. No scleral icterus.   Cardiovascular: Regular rhythm, normal heart sounds and intact distal pulses.   Pulmonary/Chest: Breath sounds normal. No respiratory distress. He exhibits no tenderness.   Abdominal: Soft. Bowel sounds are normal. There is no tenderness.   Musculoskeletal: He exhibits no edema.        Cervical back: He exhibits no tenderness.        Thoracic back: He exhibits decreased range of motion, tenderness, bony tenderness, pain and spasm.        Lumbar back: He exhibits no tenderness.        Back:    Neurological: He is oriented to person, place, and time.   Skin: Skin is warm. No rash noted. He is not diaphoretic.       ED Course        Procedures           Bladder scan performed revealing more than 400 cc of urine.  Patient able to urinate following this scan over 300 cc.  Pain appears to be adequately controlled currently.  Only other complaint currently is dry mouth.  Labs Ordered and Resulted from Time of ED Arrival Up to the Time of Departure from the ED - No data to display    Consults  Neurosurgery: Re-Called (02/06/19 5135)  Trauma: Responded (02/06/19 9560)    Assessments & Plan (with Medical Decision Making)   68-year-old male with history of chronic low back pain who presents status post fall with escalating mid back pain.  Patient was evaluated with CT of the entire spine revealing possible nondisplaced T8 fracture.  Secondary to significant pain with inability to comply with ADLs, patient was transferred for further evaluation by trauma surgery and neurosurgery.  Both services were contacted and the case discussed at length. "  The patient will be admitted to trauma surgery for further evaluation and management.    I have reviewed the nursing notes.    I have reviewed the findings, diagnosis, plan and need for follow up with the patient.       Medication List      There are no discharge medications for this visit.         Final diagnoses:   Fall, initial encounter   Acute midline low back pain without sciatica       2/6/2019   [facility], [place], EMERGENCY DEPARTMENT     Juan Brown MD  02/06/19 0836

## 2019-02-06 NOTE — PLAN OF CARE
6A-PT: Defer: PT consult received and appreciated. Per interdisciplinary discussion and chart review pt with no acute PT needs. PT to defer to OT for dc recs. Will complete orders, please re-consult if pt has change in functional status.

## 2019-02-06 NOTE — ED NOTES
[facility]   ED Nurse to Floor Handoff     Olayinka Baltazar is a 68 year old male who speaks English and lives with a spouse,  in a home  They arrived in the ED by ambulance from emergency room at [facility].    ED Chief Complaint: Back Injury  From fall, possible acute fracture of T8 in thoracic spine.  ED Dx;   Final diagnoses:   Fall, initial encounter   Acute midline low back pain without sciatica         Needed?: No    Allergies:   Allergies   Allergen Reactions     Food Anaphylaxis     ALMONDS   .  Past Medical Hx:   Past Medical History:   Diagnosis Date     Malignant neoplasm (H) 2004    oral     Tremors       Baseline Mental status: WDL  Current Mental Status changes: at basesline    Infection present or suspected this encounter: no  Sepsis suspected: No  Isolation type: No active isolations     Activity level - Baseline/Home:  Independent  Activity Level - Current:   Stand with Assist    Bariatric equipment needed?: No    In the ED these meds were given: Medications - No data to display    Drips running?  No    Home pump  No    Current LDAs  Peripheral IV 02/06/19 Left Upper arm (Active)   Site Assessment WDL 2/6/2019  6:45 AM   Line Status Infusing 2/6/2019  6:45 AM   Phlebitis Scale 0-->no symptoms 2/6/2019  6:45 AM   Infiltration Scale 0 2/6/2019  6:45 AM   Number of days: 0       Labs results: Labs Ordered and Resulted from Time of ED Arrival Up to the Time of Departure from the ED - No data to display    Imaging Studies:   Recent Results (from the past 24 hour(s))   Cervical spine CT w/o contrast    Narrative    CT CERVICAL SPINE W/O CONTRAST  2/6/2019 2:38 AM      HISTORY: Cervical spine trauma, high clinical risk (NEXUS/CCR). Trauma  and pain.    TECHNIQUE: Multiplanar imaging of the cervical spine without  intravenous contrast. Radiation dose for this scan was reduced using  automated exposure control, adjustment of the mA and/or kV  according  to patient size, or iterative reconstruction technique.     COMPARISON: None.    FINDINGS: There is no acute fracture or traumatic malalignment. There  is multilevel degenerative disc and facet disease. Disc disease is  worst at C5-C6. There is mild spinal stenosis at C5-C6. The paraspinal  soft tissues show no acute abnormalities. There are atherosclerotic  calcifications.      Impression    IMPRESSION: No acute traumatic abnormality.    JAE ROSENBERG MD   CT Thoracic Spine w/o Contrast    Narrative    CT THORACIC SPINE WITHOUT CONTRAST   2/6/2019 2:42 AM     HISTORY: See the clinical information for interpreting provider.  Trauma and pain.     TECHNIQUE: Axial images of the thoracic spine were obtained without  intravenous contrast. Multiplanar reformations were performed.   Radiation dose for this scan was reduced using automated exposure  control, adjustment of the mA and/or kV according to patient size, or  iterative reconstruction technique.    COMPARISON: Abdomen and pelvis CT 12/11/2005.    FINDINGS:  Alignment is significant for slight dextroconvex curvature.  There are flowing osteophytes present from T2 through T12 along the  right anterior aspect of the thoracic spine, consistent with diffuse  idiopathic skeletal hyperostosis (DISH). A transversely-oriented  fracture is present through the right anterior aspect of the T8  vertebral body involving the following anterior osteophytes extending  into the anterior half of the vertebral body (series 8 image 26). No  evidence of posterior element extension. No evidence of significant  spinal canal narrowing. No other fractures are identified. Multilevel  mild degenerative disc disease is present. Paraspinal soft tissues  demonstrate azygous fissure linear basilar lung opacities likely  representing atelectasis, peripherally calcified left renal lesion  (statistically likely complicated cyst), and multiple soft tissue  nodules within the left upper  "abdomen ranging in size from 1 to 3.5 cm  (likely splenules, unchanged from 12/11/2005).      Impression    IMPRESSION:  1. Findings consistent with diffuse idiopathic skeletal hyperostosis.  2. Acute nondisplaced fracture involving the anterior T8 vertebral  body and associated anterior flowing osteophyte. No significant height  loss. No evidence of posterior extension or spinal canal narrowing.    SHANON ESCOBAR MD   Lumbar spine CT w/o contrast    Narrative    CT LUMBAR SPINE W/O CONTRAST  2/6/2019 2:43 AM      HISTORY: Trauma and pain.    TECHNIQUE: Multiplanar imaging of the lumbar spine without intravenous  contrast. Radiation dose for this scan was reduced using automated  exposure control, adjustment of the mA and/or kV according to patient  size, or iterative reconstruction technique.     COMPARISON: None.    FINDINGS: There is no acute fracture or traumatic malalignment. There  is degenerative disc and facet disease throughout the lumbar spine.  There are postoperative changes at the L3-L4 level. There is mild  spinal stenosis at L3-L4 and L4-L5. The paraspinal soft tissues show  no acute abnormalities.      Impression    IMPRESSION: No acute fracture or malalignment.    JAE ROSENBERG MD       Recent vital signs:   /79   Pulse 67   Temp 97.8  F (36.6  C) (Oral)   Resp 18   Ht 1.854 m (6' 1\")   Wt 93.4 kg (206 lb)   SpO2 95%   BMI 27.18 kg/m      Cardiac Rhythm: Normal Sinus  Pt needs tele? No  Skin/wound Issues: None    Code Status: Full Code    Pain control: fair    Nausea control: pt had none    Abnormal labs/tests/findings requiring intervention:  See CT results    Family present during ED course? No   Family Comments/Social Situation comments:     Tasks needing completion: Pt placed on Oxygen 2LPM related to sats dropping to 88% on room air while sleeping.    Robert Flores RN  [phone] [facility]      "

## 2019-02-07 ENCOUNTER — PATIENT OUTREACH (OUTPATIENT)
Dept: CARE COORDINATION | Facility: CLINIC | Age: 69
End: 2019-02-07

## 2019-02-07 VITALS
HEART RATE: 60 BPM | OXYGEN SATURATION: 97 % | WEIGHT: 206 LBS | TEMPERATURE: 97.3 F | DIASTOLIC BLOOD PRESSURE: 65 MMHG | BODY MASS INDEX: 27.3 KG/M2 | HEIGHT: 73 IN | RESPIRATION RATE: 16 BRPM | SYSTOLIC BLOOD PRESSURE: 111 MMHG

## 2019-02-07 PROCEDURE — G0378 HOSPITAL OBSERVATION PER HR: HCPCS

## 2019-02-07 PROCEDURE — A9270 NON-COVERED ITEM OR SERVICE: HCPCS | Mod: GY | Performed by: NURSE PRACTITIONER

## 2019-02-07 PROCEDURE — 25000132 ZZH RX MED GY IP 250 OP 250 PS 637: Mod: GY | Performed by: STUDENT IN AN ORGANIZED HEALTH CARE EDUCATION/TRAINING PROGRAM

## 2019-02-07 PROCEDURE — 25000132 ZZH RX MED GY IP 250 OP 250 PS 637: Mod: GY | Performed by: NURSE PRACTITIONER

## 2019-02-07 PROCEDURE — A9270 NON-COVERED ITEM OR SERVICE: HCPCS | Mod: GY | Performed by: STUDENT IN AN ORGANIZED HEALTH CARE EDUCATION/TRAINING PROGRAM

## 2019-02-07 PROCEDURE — 99238 HOSP IP/OBS DSCHRG MGMT 30/<: CPT | Performed by: NURSE PRACTITIONER

## 2019-02-07 RX ORDER — METHOCARBAMOL 500 MG/1
1000 TABLET, FILM COATED ORAL EVERY 6 HOURS
Qty: 80 TABLET | Refills: 0 | Status: SHIPPED | OUTPATIENT
Start: 2019-02-07 | End: 2019-02-17

## 2019-02-07 RX ORDER — AMOXICILLIN 250 MG
2 CAPSULE ORAL AT BEDTIME
Qty: 30 TABLET | Refills: 0 | Status: SHIPPED | OUTPATIENT
Start: 2019-02-07 | End: 2019-03-09

## 2019-02-07 RX ORDER — OXYCODONE HYDROCHLORIDE 5 MG/1
5-10 TABLET ORAL EVERY 6 HOURS PRN
Qty: 20 TABLET | Refills: 0 | Status: SHIPPED | OUTPATIENT
Start: 2019-02-07 | End: 2019-02-07

## 2019-02-07 RX ORDER — ACETAMINOPHEN 325 MG/1
975 TABLET ORAL EVERY 6 HOURS
Qty: 360 TABLET | Refills: 0 | COMMUNITY
Start: 2019-02-07 | End: 2019-03-09

## 2019-02-07 RX ORDER — POLYETHYLENE GLYCOL 3350 17 G/17G
17 POWDER, FOR SOLUTION ORAL DAILY
Status: DISCONTINUED | OUTPATIENT
Start: 2019-02-07 | End: 2019-02-07 | Stop reason: HOSPADM

## 2019-02-07 RX ORDER — AMOXICILLIN 250 MG
2 CAPSULE ORAL AT BEDTIME
Status: DISCONTINUED | OUTPATIENT
Start: 2019-02-07 | End: 2019-02-07 | Stop reason: HOSPADM

## 2019-02-07 RX ORDER — OXYCODONE HYDROCHLORIDE 5 MG/1
5 TABLET ORAL EVERY 6 HOURS PRN
Qty: 20 TABLET | Refills: 0 | Status: SHIPPED | OUTPATIENT
Start: 2019-02-07 | End: 2019-03-09

## 2019-02-07 RX ORDER — METHOCARBAMOL 500 MG/1
1000 TABLET, FILM COATED ORAL EVERY 6 HOURS
Status: DISCONTINUED | OUTPATIENT
Start: 2019-02-07 | End: 2019-02-07 | Stop reason: HOSPADM

## 2019-02-07 RX ORDER — POLYETHYLENE GLYCOL 3350 17 G/17G
17 POWDER, FOR SOLUTION ORAL DAILY
Qty: 30 PACKET | Refills: 0 | Status: SHIPPED | OUTPATIENT
Start: 2019-02-08 | End: 2019-03-10

## 2019-02-07 RX ORDER — LIDOCAINE 4 G/G
2 PATCH TOPICAL EVERY 24 HOURS
Qty: 12 PATCH | Refills: 0 | Status: SHIPPED | OUTPATIENT
Start: 2019-02-07 | End: 2019-03-09

## 2019-02-07 RX ADMIN — POLYETHYLENE GLYCOL 3350 17 G: 17 POWDER, FOR SOLUTION ORAL at 09:39

## 2019-02-07 RX ADMIN — LIDOCAINE 2 PATCH: 560 PATCH PERCUTANEOUS; TOPICAL; TRANSDERMAL at 08:11

## 2019-02-07 RX ADMIN — OXYCODONE HYDROCHLORIDE 10 MG: 5 TABLET ORAL at 08:13

## 2019-02-07 RX ADMIN — OXYCODONE HYDROCHLORIDE 10 MG: 5 TABLET ORAL at 13:16

## 2019-02-07 RX ADMIN — METHOCARBAMOL 1000 MG: 500 TABLET, FILM COATED ORAL at 09:39

## 2019-02-07 RX ADMIN — METHOCARBAMOL 500 MG: 500 TABLET, FILM COATED ORAL at 04:05

## 2019-02-07 RX ADMIN — PROPRANOLOL HYDROCHLORIDE 40 MG: 10 TABLET ORAL at 08:14

## 2019-02-07 RX ADMIN — ACETAMINOPHEN 975 MG: 325 TABLET, FILM COATED ORAL at 04:05

## 2019-02-07 RX ADMIN — ACETAMINOPHEN 975 MG: 325 TABLET, FILM COATED ORAL at 12:25

## 2019-02-07 RX ADMIN — OXYCODONE HYDROCHLORIDE 10 MG: 5 TABLET ORAL at 04:05

## 2019-02-07 RX ADMIN — PANTOPRAZOLE SODIUM 40 MG: 40 TABLET, DELAYED RELEASE ORAL at 08:10

## 2019-02-07 ASSESSMENT — VISUAL ACUITY: OU: NORMAL ACUITY

## 2019-02-07 NOTE — DISCHARGE SUMMARY
Patient discharge home with his wife at 1335. Patient reported tolerable pain level and was given Oxy IR 10 mg prior discharge. CMS intact and vss on room. Patient and wife educated for discharge instructions such as pain management, level of activity, to resume home diet, home med and new med instructions including side effects. Patient also educated to contact primary doctor or hospital if pain became uncontrolled with the pain meds. Patient reported no question or concern for discharge instructions. Patient assisted to vehicle using wheelchair by this RN.

## 2019-02-07 NOTE — DISCHARGE SUMMARY
[facility] [place]    Discharge Summary  Trauma Surgery Service    Date of Admission:  2/6/2019  Date of Discharge:  2/7/2019  Discharging Provider: Sundeep Vicente CNP  Date of Service (when I saw the patient): 02/07/19    Primary Provider: [facility]  Primary Care clinic: [address]  [facility] [address]  Phone: None  Fax number: [phone]     Discharge Diagnoses      Fall  Traumatic Fracture through the anterior T8 Vertebral Body and Osteophyte Complex    Hospital Course   Olayinka Baltazar is a 68 year old male with a history of spinal stenosis who is status post lumbar laminectomy at L3-L4 approximately 12 years ago who presents for evaluation of back pain.  Patient states that he was in his usual state of health until approximately 230 yesterday or 17 hours ago when he slipped and fell landing on his lower and mid back.  He states that he instantly had pain and began taking ibuprofen without relief.  He went to his clinic and was later transferred to the emergency room at [facility] in [place].  Workup there included CT of the cervical, thoracic and lumbar spine revealing possible T8 vertebral body fracture.  He was treated with Dilaudid and Ativan but continued to have extreme pain in his lower mid back that was worse with any type of movement.  As well he had bilateral neck tenderness.  He states that he hit his head but had no loss of consciousness nor is he on any type of blood thinners.  He presents here for further evaluation and pain control. He denies numbness or tingling, any lower or upper extremity pain. His hospital course and plan below.    Ground level fall on icy surface  The patient sustained the above injury as a result of fall.  He was seen by the Trauma team and injury prevention education was performed.  The mechanism of injury and factors contributing to the accident were discussed with the patient.  Strategies on how to prevent future  accidents were reviewed.  The patient underwent tertiary examination to evaluate for additional injuries.  The systematic review did not find any other injuries.    Traumatic fracture of theT 12 vertebrae  Olayinka Baltazar was evaluated by Neurosurgery and managed non-operatively for his fractures. No brace or follow up was recommended. He is recommended to Weight bearing as tolerated. Neurosurgery requests no formal follow-up or repeat imaging. He was evaluated by physical and occupational therapies during his/her hospitalization.  Please see summary of most current therapy notes below.     Acute pain  Pain was controlled with a multi-modality approach.  The current regimen for Olayinka Baltazar includes the following, scheduled acetaminophen, topical analgesic, muscle relaxant and PRN short acting opioids.  Adequate pain control was achieved with this regimen.  We anticipate that they will taper off this regimen over the next several weeks.    # Discharge Pain Plan:   - During his hospitalization, Olayinka experienced pain due to vertebrae fracture.  The pain plan for discharge was discussed with Olayinka and the plan was created in a collaborative fashion.    - Opioids prescribed on discharge: Oxycodone  - Duration of opioids after discharge: Per CDC opioid prescribing guidelines, a 3 day prescription of opioids was provided.  - Bowel regimen: senna and miralax  - Pharmacologic adjuvants:  Acetaminophen, Lidocaine patch and Methocarbamol  - Non-pharmacologic adjuvants: Ice     Therapy Recommendations:   Current status of occupational therapies on discharge:  Patient plan for discharge: home  Current status: Pt required Min A for bed mobility, Max A for LE dressing, but SBA or better for walking, toilet tx and stairs. VSS while on RA  Barriers to return to prior living situation: spinal precautions, pain  Recommendations for discharge: home with A from family prn.  Rationale for recommendations: anticipate pt will be  "able to complete ADLs safely with  Code Status   Full Code    SUBJECTIVE:States he feels his pain is well controlled on the current regimen. Feels better when no lying flat in bed. Want to go home        Discharge Disposition   Discharged to home  Condition at discharge: Stable  Discharge VS: Blood pressure 137/89, pulse 60, temperature 97.3  F (36.3  C), temperature source Oral, resp. rate 16, height 1.854 m (6' 1\"), weight 93.4 kg (206 lb), SpO2 92 %.    Consultations This Hospital Stay   MEDICATION HISTORY IP PHARMACY CONSULT  PHYSICAL THERAPY ADULT IP CONSULT  OCCUPATIONAL THERAPY ADULT IP CONSULT    Discharge Orders      Reason for your hospital stay    Fall  T12 fracture     Adult [facility] Follow-up and recommended labs and tests    Follow up with your primary care provider for continued medical care and hospital follow up in 5-10 days.     Medication Therapy Management Services  If you have any questions regarding your medications after discharge, this service is available to you.  Please call:  [phone] or [phone] (toll-free)  [facility]  [address] [place] [id number]  [email or web address]    Neurosurgery Clinic as needed  [facility]  Floor 3   [address] [place] [address]  Appointments: [phone]    You have been involved in a recent trauma incident resulting in an injury.  Studies show us that people affected by trauma have higher levels of post-traumatic stress disorder (PTSD) and/or depressive symptoms during the year following an injury.     Please answer the following:  Had migraines about the event(s) or thought about the event(s) when you didn t want to?  Tried hard not to think about the event(s) or went out of your way to avoid situations that reminded you of the event(s)?  Been constantly on guard, watchful, or easily startled?  Felt numb or detached from people, activities, or your surroundings?   Felt " guilty or unable to stop blaming yourself or others for the event(s) or any problems the event (s) may have caused?    If you answered  yes  to 3 or more of these questions, or if you simply want to discuss any of your feelings further, we recommend that you talk with your Primary Care Provider or a mental health professional.            Appointments on [place] and/or [facility] (with [facility] or [facility] provider or service). Call [phone] if you haven't heard regarding these appointments within 7 days of discharge.     Activity    Your activity upon discharge: activity as tolerated     When to contact your care team    Call your primary doctor if you have any of the following: increased pain or new onset numbness, tingling or diminished sentation.     Full Code     Diet    Follow this diet upon discharge: Regular     Discharge Medications   Current Discharge Medication List      START taking these medications    Details   acetaminophen (TYLENOL) 325 MG tablet Take 3 tablets (975 mg) by mouth every 6 hours  Qty: 360 tablet, Refills: 0    Associated Diagnoses: Acute midline low back pain without sciatica      Lidocaine (LIDOCARE) 4 % Patch Place 2 patches onto the skin every 24 hours  Qty: 12 patch, Refills: 0    Associated Diagnoses: Acute midline low back pain without sciatica      methocarbamol (ROBAXIN) 500 MG tablet Take 2 tablets (1,000 mg) by mouth every 6 hours for 10 days  Qty: 80 tablet, Refills: 0    Associated Diagnoses: Acute midline low back pain without sciatica      oxyCODONE (ROXICODONE) 5 MG tablet Take 1 tablet (5 mg) by mouth every 6 hours as needed for moderate to severe pain  Qty: 20 tablet, Refills: 0    Associated Diagnoses: Acute midline low back pain without sciatica      polyethylene glycol (MIRALAX/GLYCOLAX) packet Take 17 g by mouth daily  Qty: 30 packet, Refills: 0    Associated Diagnoses: Acute midline low back pain without sciatica      senna-docusate (SENOKOT-S/PERICOLACE) 8.6-50  MG tablet Take 2 tablets by mouth At Bedtime  Qty: 30 tablet, Refills: 0    Associated Diagnoses: Acute midline low back pain without sciatica         CONTINUE these medications which have NOT CHANGED    Details   ibuprofen (ADVIL/MOTRIN) 200 MG tablet Take 200 mg by mouth every 6 hours as needed for mild pain      pantoprazole (PROTONIX) 40 MG EC tablet Take 40 mg by mouth daily       propranolol (INDERAL) 40 MG tablet Take 40 mg by mouth daily       simvastatin (ZOCOR) 40 MG tablet Take 40 mg by mouth At Bedtime          STOP taking these medications       naproxen sodium (ANAPROX) 220 MG tablet Comments:   Reason for Stopping:             Allergies   Allergies   Allergen Reactions     Eastern [Nuts] Anaphylaxis     Data   Most Recent 3 CBC's:  Recent Labs   Lab Test 02/06/19  1056   WBC 7.8   HGB 14.2   *         Most Recent 3 BMP's:  Recent Labs   Lab Test 02/06/19  1056      POTASSIUM 3.9   CHLORIDE 110*   CO2 29   BUN 20   CR 0.90   ANIONGAP 4   MARIAJOSE 8.5   GLC 96     Most Recent 2 LFT's:No lab results found.  Most Recent INR's and Anticoagulation Dosing History:  Anticoagulation Dose History     There is no flowsheet data to display.        Most Recent 3 Troponin's:No lab results found.  Most Recent 6 Bacteria Isolates From Any Culture (See EPIC Reports for Culture Details):No lab results found.  Most Recent TSH, T4 and A1c Labs:No lab results found.  Results for orders placed or performed during the hospital encounter of 02/06/19   Cervical spine CT w/o contrast    Narrative    CT CERVICAL SPINE W/O CONTRAST  2/6/2019 2:38 AM      HISTORY: Cervical spine trauma, high clinical risk (NEXUS/CCR). Trauma  and pain.    TECHNIQUE: Multiplanar imaging of the cervical spine without  intravenous contrast. Radiation dose for this scan was reduced using  automated exposure control, adjustment of the mA and/or kV according  to patient size, or iterative reconstruction technique.     COMPARISON:  None.    FINDINGS: There is no acute fracture or traumatic malalignment. There  is multilevel degenerative disc and facet disease. Disc disease is  worst at C5-C6. There is mild spinal stenosis at C5-C6. The paraspinal  soft tissues show no acute abnormalities. There are atherosclerotic  calcifications.      Impression    IMPRESSION: No acute traumatic abnormality.    JAE ROSENBERG MD   CT Thoracic Spine w/o Contrast    Narrative    CT THORACIC SPINE WITHOUT CONTRAST   2/6/2019 2:42 AM     HISTORY: See the clinical information for interpreting provider.  Trauma and pain.     TECHNIQUE: Axial images of the thoracic spine were obtained without  intravenous contrast. Multiplanar reformations were performed.   Radiation dose for this scan was reduced using automated exposure  control, adjustment of the mA and/or kV according to patient size, or  iterative reconstruction technique.    COMPARISON: Abdomen and pelvis CT 12/11/2005.    FINDINGS:  Alignment is significant for slight dextroconvex curvature.  There are flowing osteophytes present from T2 through T12 along the  right anterior aspect of the thoracic spine, consistent with diffuse  idiopathic skeletal hyperostosis (DISH). A transversely-oriented  fracture is present through the right anterior aspect of the T8  vertebral body involving the following anterior osteophytes extending  into the anterior half of the vertebral body (series 8 image 26). No  evidence of posterior element extension. No evidence of significant  spinal canal narrowing. No other fractures are identified. Multilevel  mild degenerative disc disease is present. Paraspinal soft tissues  demonstrate azygous fissure linear basilar lung opacities likely  representing atelectasis, peripherally calcified left renal lesion  (statistically likely complicated cyst), and multiple soft tissue  nodules within the left upper abdomen ranging in size from 1 to 3.5 cm  (likely splenules, unchanged from  12/11/2005).      Impression    IMPRESSION:  1. Findings consistent with diffuse idiopathic skeletal hyperostosis.  2. Acute nondisplaced fracture involving the anterior T8 vertebral  body and associated anterior flowing osteophyte. No significant height  loss. No evidence of posterior extension or spinal canal narrowing.    SHANON ESCOBAR MD   Lumbar spine CT w/o contrast    Narrative    CT LUMBAR SPINE W/O CONTRAST  2/6/2019 2:43 AM      HISTORY: Trauma and pain.    TECHNIQUE: Multiplanar imaging of the lumbar spine without intravenous  contrast. Radiation dose for this scan was reduced using automated  exposure control, adjustment of the mA and/or kV according to patient  size, or iterative reconstruction technique.     COMPARISON: None.    FINDINGS: There is no acute fracture or traumatic malalignment. There  is degenerative disc and facet disease throughout the lumbar spine.  There are postoperative changes at the L3-L4 level. There is mild  spinal stenosis at L3-L4 and L4-L5. The paraspinal soft tissues show  no acute abnormalities.      Impression    IMPRESSION: No acute fracture or malalignment.    JAE ROSENBERG MD       Time Spent on this Encounter   I, Sundeep Vicente, personally saw the patient today and spent less than or equal to 30 minutes discharging this patient.    We appreciate the opportunity to care for your patient while in the hospital.  Should you have any questions about their injuries or this discharge summary our contact information is below.    Trauma Services  [facility]   Department of Critical Care and Acute Care Surgery  [address] [facility] [address]  [place] [address]  Office: [phone]

## 2019-02-07 NOTE — PLAN OF CARE
Admit for T8 fx d/t fall. Neuros intact ex headache to back of head. VSS on RA. Regular diet. Voids without difficulty. Up 1/SBA. Oxy and tylenol for pack pain. Robaxin for muscle spasms.  PIV SL. Plan discharge to home when ready. Continue to monitor.   Observation goals:  Adequate pain control on oral analgesia. Improving with 10mg oxy and 975mg tylenol PO.  - Vital Signs normal or at patient baseline. VSS on RA.  - Tolerating oral intake to maintain hydration. Tolerating PO intake.  - Diagnostic tests and consults completed and resulted. Pending PT/OT evaluation.  - Cleared for discharge from consultants' standpoint if involved in care. Pending.  - Safe disposition plan has been identified. Pending  - Return to baseline functional status. Pending.  - Nurse to notify provider when observation goals have been met and patient is ready for discharge. Discharge goal not yet met.

## 2019-02-07 NOTE — PLAN OF CARE
- Adequate pain control on oral analgesia: NO, patient still c/o significant back pain with movement. Taking PRN oxy and scheduled tylenol and robaxin.  - Vital Signs normal or at patient baseline: YES, AVSS and pt is A&Ox4. Neuros intact besides generalized weakness d/t back pain.  - Tolerating oral intake to maintain hydration: YES, regular diet with good PO intake. IV SL. Voiding spon. Last BM 2/3.  - Diagnostic tests and consults completed and resulted: YES, PT and OT assessed pt.  - Cleared for discharge from consultants' standpoint if involved in care: NO  - Safe disposition plan has been identified: NO  - Return to baseline functional status: NO, pain not managed well for pt to be independent/safe with cares. Pt up with A1 and GB.    Wife present and supportive. BA on. Continue with POC.

## 2019-02-07 NOTE — PROGRESS NOTES
Adequate pain control on oral analgesia and muscle relaxer such as Robaxin 1000 mg, Oxy IR 10mg prn every 4 hours and tylenol 975 mg PO scheduled, pain gets worse with movement. Patient reported tolerable pain level at rest.  - Vital Signs normal or at patient baseline. VSS on RA.  - Tolerating oral intake to maintain hydration. Tolerating PO intake without nausea/vomiting.  - Diagnostic tests and consults completed and resulted. Cleared to be discharged.  - Cleared for discharge from consultants' standpoint if involved in care. yes.  - Safe disposition plan has been identified. yes  - Return to baseline functional status. Yes with pain management regimen.  - Nurse to notify provider when observation goals have been met and patient is ready for discharge. Met

## 2019-02-07 NOTE — PROGRESS NOTES
Adequate pain control on oral analgesia. Oxy IR 10mg prn every 4 hours and tylenol 975 mg PO scheduled, pain gets worse with movement. Patient reported tolerable pain level at rest.  - Vital Signs normal or at patient baseline. VSS on RA.  - Tolerating oral intake to maintain hydration. Tolerating PO intake without nausea/vomiting.  - Diagnostic tests and consults completed and resulted. Pending PT/OT evaluation.  - Cleared for discharge from consultants' standpoint if involved in care. Pending.  - Safe disposition plan has been identified. Pending  - Return to baseline functional status. Pending.  - Nurse to notify provider when observation goals have been met and patient is ready for discharge. Discharge goal not yet met

## 2019-02-07 NOTE — PLAN OF CARE
Occupational Therapy Discharge Summary    Reason for therapy discharge:    Discharged to home.    Progress towards therapy goal(s). See goals on Care Plan in [facility] electronic health record for goal details.  Pt. not seen by d/cing therapist per chart. Pt. SBA with mobility, A with LB dressing 2/2 post op precautions. family can A prn.      Therapy recommendation(s):  home with family A   No further therapy is recommended.

## 2019-02-08 NOTE — PROGRESS NOTES
Patient was called three times and no answer so post 24 hr DC follow up calls will be closed out    Not able to leave a message at numbers provided

## 2019-11-14 ENCOUNTER — MEDICAL CORRESPONDENCE (OUTPATIENT)
Dept: HEALTH INFORMATION MANAGEMENT | Facility: CLINIC | Age: 69
End: 2019-11-14

## 2019-12-13 ENCOUNTER — HOSPITAL ENCOUNTER (OUTPATIENT)
Facility: CLINIC | Age: 69
Discharge: HOME | End: 2019-12-13
Attending: SURGERY | Admitting: SURGERY
Payer: MEDICARE

## 2019-12-13 VITALS
RESPIRATION RATE: 16 BRPM | SYSTOLIC BLOOD PRESSURE: 117 MMHG | WEIGHT: 205 LBS | DIASTOLIC BLOOD PRESSURE: 73 MMHG | HEART RATE: 64 BPM | BODY MASS INDEX: 25.49 KG/M2 | HEIGHT: 75 IN | OXYGEN SATURATION: 96 %

## 2019-12-13 DIAGNOSIS — K21.00 GASTROESOPHAGEAL REFLUX DISEASE WITH ESOPHAGITIS: Primary | ICD-10-CM

## 2019-12-13 LAB — UPPER GI ENDOSCOPY: NORMAL

## 2019-12-13 PROCEDURE — 25000128 H RX IP 250 OP 636: Performed by: SURGERY

## 2019-12-13 PROCEDURE — 43233 EGD BALLOON DIL ESOPH30 MM/>: CPT | Performed by: SURGERY

## 2019-12-13 PROCEDURE — G0500 MOD SEDAT ENDO SERVICE >5YRS: HCPCS | Performed by: SURGERY

## 2019-12-13 PROCEDURE — 43239 EGD BIOPSY SINGLE/MULTIPLE: CPT | Mod: XS | Performed by: SURGERY

## 2019-12-13 PROCEDURE — 43249 ESOPH EGD DILATION <30 MM: CPT | Performed by: SURGERY

## 2019-12-13 PROCEDURE — 88305 TISSUE EXAM BY PATHOLOGIST: CPT | Mod: 26 | Performed by: SURGERY

## 2019-12-13 PROCEDURE — 43239 EGD BIOPSY SINGLE/MULTIPLE: CPT | Mod: 59 | Performed by: SURGERY

## 2019-12-13 PROCEDURE — 99153 MOD SED SAME PHYS/QHP EA: CPT | Performed by: SURGERY

## 2019-12-13 PROCEDURE — 25000125 ZZHC RX 250: Performed by: SURGERY

## 2019-12-13 PROCEDURE — 88305 TISSUE EXAM BY PATHOLOGIST: CPT | Performed by: SURGERY

## 2019-12-13 RX ORDER — LIDOCAINE 40 MG/G
CREAM TOPICAL
Status: DISCONTINUED | OUTPATIENT
Start: 2019-12-13 | End: 2019-12-13 | Stop reason: HOSPADM

## 2019-12-13 RX ORDER — METOCLOPRAMIDE HYDROCHLORIDE 5 MG/ML
10 INJECTION INTRAMUSCULAR; INTRAVENOUS EVERY 6 HOURS PRN
Status: CANCELLED | OUTPATIENT
Start: 2019-12-13

## 2019-12-13 RX ORDER — ONDANSETRON 2 MG/ML
4 INJECTION INTRAMUSCULAR; INTRAVENOUS EVERY 6 HOURS PRN
Status: CANCELLED | OUTPATIENT
Start: 2019-12-13

## 2019-12-13 RX ORDER — FENTANYL CITRATE 50 UG/ML
INJECTION, SOLUTION INTRAMUSCULAR; INTRAVENOUS PRN
Status: DISCONTINUED | OUTPATIENT
Start: 2019-12-13 | End: 2019-12-13 | Stop reason: HOSPADM

## 2019-12-13 RX ORDER — PROCHLORPERAZINE MALEATE 5 MG
5 TABLET ORAL EVERY 6 HOURS PRN
Status: CANCELLED | OUTPATIENT
Start: 2019-12-13

## 2019-12-13 RX ORDER — ONDANSETRON 2 MG/ML
4 INJECTION INTRAMUSCULAR; INTRAVENOUS
Status: DISCONTINUED | OUTPATIENT
Start: 2019-12-13 | End: 2019-12-13 | Stop reason: HOSPADM

## 2019-12-13 RX ORDER — PANTOPRAZOLE SODIUM 40 MG/1
40 TABLET, DELAYED RELEASE ORAL
Qty: 84 TABLET | Refills: 0 | Status: SHIPPED | OUTPATIENT
Start: 2019-12-13 | End: 2020-01-24

## 2019-12-13 RX ORDER — FLUMAZENIL 0.1 MG/ML
0.2 INJECTION, SOLUTION INTRAVENOUS
Status: CANCELLED | OUTPATIENT
Start: 2019-12-13 | End: 2019-12-13

## 2019-12-13 RX ORDER — ONDANSETRON 4 MG/1
4 TABLET, ORALLY DISINTEGRATING ORAL EVERY 6 HOURS PRN
Status: CANCELLED | OUTPATIENT
Start: 2019-12-13

## 2019-12-13 RX ORDER — METOCLOPRAMIDE 10 MG/1
10 TABLET ORAL EVERY 6 HOURS PRN
Status: CANCELLED | OUTPATIENT
Start: 2019-12-13

## 2019-12-13 RX ORDER — NALOXONE HYDROCHLORIDE 0.4 MG/ML
.1-.4 INJECTION, SOLUTION INTRAMUSCULAR; INTRAVENOUS; SUBCUTANEOUS
Status: CANCELLED | OUTPATIENT
Start: 2019-12-13 | End: 2019-12-14

## 2019-12-13 ASSESSMENT — MIFFLIN-ST. JEOR: SCORE: 1777.56

## 2019-12-13 NOTE — H&P
Pre-Endoscopy History and Physical     Olayinka Baltazar MRN# [id number]   YOB: 1950 Age: 69 year old     Date of Procedure: 12/13/2019  Primary care provider: Taras Denny  Type of Endoscopy: esophagogastroduodenoscopy (upper GI endoscopy)  Reason for Procedure: Hodsgon's surveillance  Type of Anesthesia Anticipated: Moderate Sedation    HPI:    Olayinka is a 69 year old male who will be undergoing the above procedure.  Previous EGD with short segment (1cm Hodgson's, indefinite for dysplasia.    A history and physical has been performed. The patient's medications and allergies have been reviewed. The risks and benefits of the procedure and the sedation options and risks were discussed with the patient.  All questions were answered and informed consent was obtained.      He denies a personal or family history of anesthesia complications or bleeding disorders.     Allergies   Allergen Reactions     Stinnett [Nuts] Anaphylaxis        Prior to Admission Medications   Prescriptions Last Dose Informant Patient Reported? Taking?   ibuprofen (ADVIL/MOTRIN) 200 MG tablet 12/12/2019 at Unknown time  Yes Yes   Sig: Take 200 mg by mouth every 6 hours as needed for mild pain   pantoprazole (PROTONIX) 40 MG EC tablet 12/13/2019 at Unknown time  Yes Yes   Sig: Take 40 mg by mouth daily    propranolol (INDERAL) 40 MG tablet 12/13/2019 at Unknown time  Yes Yes   Sig: Take 40 mg by mouth daily    simvastatin (ZOCOR) 40 MG tablet 12/12/2019 at Unknown time  Yes Yes   Sig: Take 40 mg by mouth At Bedtime       Facility-Administered Medications: None       Patient Active Problem List   Diagnosis     Back pain        Past Medical History:   Diagnosis Date     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 Hermann TRUJILLO     MOUTH SURGERY  2004    removal mouth ca     ORTHOPEDIC SURGERY       SPLENECTOMY  1966     TONSILLECTOMY  1979       Social History     Tobacco Use     Smoking status: Current Every Day Smoker     Packs/day: 0.50     Years: 40.00     Pack years: 20.00     Types: Cigarettes     Smokeless tobacco: Never Used   Substance Use Topics     Alcohol use: No     Comment: 3/9/2009 - quit       Family History   Problem Relation Age of Onset     Coronary Artery Disease Mother      Coronary Artery Disease Brother      Colon Cancer No family hx of        REVIEW OF SYSTEMS:     5 point ROS negative except as noted above in HPI, including Gen., Resp., CV, GI &  system review.      PHYSICAL EXAM:   Ht 1.892 m (6' 2.5\")   Wt 93 kg (205 lb)   BMI 25.97 kg/m   Estimated body mass index is 25.97 kg/m  as calculated from the following:    Height as of this encounter: 1.892 m (6' 2.5\").    Weight as of this encounter: 93 kg (205 lb).   GENERAL APPEARANCE: healthy, alert and no distress  MENTAL STATUS: alert  AIRWAY EXAM: Mallampatti Class III (visualization of the soft palate and base of uvula)  RESP: lungs clear to auscultation - no rales, rhonchi or wheezes  CV: regular rates and rhythm      DIAGNOSTICS:    Not indicated      IMPRESSION   ASA Class 2 - Mild systemic disease        PLAN:       Plan for esophagogastroduodenoscopy (upper GI endoscopy). We discussed the risks, benefits and alternatives and the patient wished to proceed.    The above has been forwarded to the consulting provider.      Signed Electronically by: Prem Baumann MD  December 13, 2019    "

## 2019-12-13 NOTE — LETTER
November 20, 2019      Olayinka Baltazar  [address] [address]        Dear Olayinka,       Thank you for choosing [facility]. You are scheduled for the following service(s).   Please be aware that coverage of these services is subject to the terms and limitations of your health insurance plan.  Call member services at your health plan with any benefit or coverage questions.    Date:   12-13-19      Procedure: UPPER ENDOSCOPY-EGD  Doctor: Pastor           Arrival Time:  1030    *check in at Emergency/Endoscopy desk*  Procedure Time: 1100       Location:   [facility]        Endoscopy Department, First Floor (Enter through ER Doors) *         [address] [place], [place] [address] [phone] or [phone] () to reschedule          PRE-PROCEDURE CHECKLIST    If you have diabetes, ask your regular doctor for diet and medication restrictions.  If you take any antiplatelet or anticoagulant medications (such as Coumadin, Lovenox, Plavix, etc.) and have not already discussed this, please call your primary physician for advice on holding this medication.  If you take Aspirin, you may continue to do so.  If you are or may be pregnant, please discuss the risks and benefits of this procedure with your doctor.  You must arrange for a ride for the day of your exam. If you fail to arrange transportation with a responsible adult, your procedure will need to be cancelled and rescheduled. Taxi, bus and medical transport are not acceptable unless you have a responsible adult that you know & trust with you. Please arrange for this  to be able to pick you up in our department, approximately one hour after your scheduled procedure, if they are not able to stay with you.      Canceling or rescheduling   If you must cancel or reschedule your appointment, please call [phone] as soon as possible.      Upper Endoscopy or  Esophagogastroduodenoscopy (EGD) is a test performed to evaluate symptoms of persistent abdominal pain, nausea, vomiting and difficulty swallowing. It may also be used to treat various conditions of the upper gastrointestinal tract, such as bleeding, narrowing or abnormal growths.     What happens during an upper endoscopy?  On the day of your procedure, plan to spend up to one and a half hour after your arrival at the endoscopy center. The exam itself takes about 5 to 10 minutes.    Before the exam:  - You will change into a gown.   - Your medical history and medication list will be reviewed with you, unless it has already been done over the phone.   - A nurse will insert an intravenous (IV) line into your hand or arm.  - The doctor will talk to you and give you a consent form to sign.    During the exam:  - Medicine will be given through the IV line to help you relax and feel comfortable.   - Your heart rate and oxygen levels will be monitored. If your blood pressure is low, you may be given fluids through the IV line.   - The doctor will insert a flexible, hollow tube, called an endoscope, into your mouth and will advance it slowly through the esophagus, stomach and duodenum (the first part of your small intestine).   - You may have a feeling of pressure or fullness.   - If you have difficulty swallowing, and the doctor finds a narrowing in your esophagus, it may be possible for the area to be expanded-dilated during the exam.   - If abnormal tissue is found, the doctor may remove it through the endoscope (biopsy it) for closer examination. The tissue removal is painless.    After the exam:  - Any tissue samples removed during the exam will be sent to a lab for evaluation. It may take 5 to 7 working days for you to be notified of the results  - The doctor will prepare a full report for the physician who referred you for the upper endoscopy.   - The doctor will talk with you about the initial results of your exam.    - You may feel bloated after the procedure. That is normal and should not last long.   - Your throat may feel sore for a short time.   - Following the exam, you may resume your normal diet. Avoid alcohol until the next day.   - You may resume your regular activities the day after the procedure.   - Medication given during the exam will prohibit you from driving for the rest of the day.  - A nurse will provide you with complete discharge instructions before you leave the endoscopy center. Be sure to ask the nurse for specific instructions if you take blood thinners such as Aspirin , Coumadin , Lovenox , Plavix , etc.       PREPARATION    To ensure a successful exam, please follow all instructions carefully.      The night before your exam:    STOP eating solid foods at 11:45 pm.     Clear liquids are okay to drink (examples: Gatorade , apple juice, clear broth,coffee or tea without milk or cream, etc.).     DO NOT drink red liquids or alcoholic beverages.    The day of your exam:    STOP drinking clear liquids 4 hours before your exam.     You may take your usual medications with 4 oz. of water, but it needs to be at least 4 hours prior to your procedure.    When you leave for the procedure:    Bring a list of all of your current medications, including any allergy or over-the-counter medications, unless you have already reviewed that with an Endoscopy RN over the phone.     Bring a photo ID as well as up-to-date insurance information, such as your insurance card and any referral forms that might be required by your payer.         DIRECTIONS TO THE ENDOSCOPY DEPARTMENT    From the north ([facility])  Take 35W south, exit on [address] . Get into the left hand isidoro, turn left (east), go one-half mile to [address] and turn left. Go north to the first stoplight, take a right on [address] and follow it to the Emergency entrance.    From the south ([facility])  Take 35N  to the 35E split and exit on [address] . On [address] , turn left (west) to [address]. Turn right (north) on [address]. Go north to the first stoplight, take a right on [address] and follow it to the Emergency entrance.    From the east via 35E ([facility])  Take 35E south to [place] [address] exit. Turn right on [address] . Go west to [address]. Turn right (north) on [address]. Go to the first stoplight, take a right and follow on [address] to the Emergency entrance.    From the east via [address] ([facility])  Take [address] West to [address]. Turn left (south) on [address] to [address]. Turn left (east) on [address] and follow it to the Emergency entrance.    From the west via [address] 13 ([facility])  Take [address] east to [address]. Turn right (south) on [address] to [address]. Turn left (east) on [address] and follow it to the Emergency entrance.

## 2019-12-13 NOTE — LETTER
Olayinka ABEL [place]  [address] [address]      December 16, 2019    Dear Olayinka,  This letter is to inform you of the results of your pathology report on your EGD biopsies.    Your pathology report was:  FINAL DIAGNOSIS:   A: Stomach, biopsies.   - Antral and gastric body type mucosa focal minimal superficial   nonspecific appearing chronic   inflammation/gastritis involving body type mucosa.  Helicobacter not   identified.  Negative for dysplasia and   malignancy.     B: Esophagus, 40 cm, biopsies.   - Esophageal squamous and glandular columnar mucosa with intestinal goblet    cell metaplasia consistent with   Hodgson's esophagus.  Chronic inflammation and reactive changes present.     Negative for dysplasia and   malignancy.     C: Esophagus, 38 cm, biopsies.   - Esophageal squamous and focal columnar glandular mucosa with   inflammation, ulceration and granulation tissue   formation.  No intestinal goblet cell metaplasia identified.  Negative for    dysplasia and malignancy.     D: Esophagus at 36 cm, biopsies.   - Esophageal squamous mucosa with acute and chronic inflammation,   ulceration, granulation tissue formation,   and reactive changes.  Small fragment of gastric tissue present.  No   intestinal goblet cell metaplasia   identified.  Negative for dysplasia and malignancy.  The biopsies showed the Hodgson's in one part of the biopsies. No dysplasia seen. The other areas of biopsy show inflammation which should improve with the increased medication for the next few weeks. You will need another EGD in 3-5 years for new biopsies. As we had discussed briefly, surgical repair of your hiatal hernia can be another option. I can discuss this further if you wish to make a clinic appointment.  If you do have further questions please don t hesitate to call my assistant at [phone].  We do not have someone answering the phone all the time at my assistants number so if leave a message may  take a day or so to get back to you.  So if more urgent then call the below number.    To make an appointment call .   Sincerely,     Prem Baumann M.D.

## 2019-12-13 NOTE — DISCHARGE INSTRUCTIONS
"  Tips to Control Acid Reflux  To control acid reflux, you ll need to make some basic diet and lifestyle changes. The simple steps outlined below may be all you ll need to relieve discomfort.   Watch What You Eat      Avoid fatty foods and spicy foods.    Eat fewer acidic foods, such as citrus and tomato-based foods. These can increase symptoms.     Limit drinking alcohol, caffeine, and fizzy beverages. All increase acid reflux.    Try limiting chocolate, peppermint, and spearmint. These can worsen acid reflux in some people.    Watch When You Eat    Avoid lying down for 3 hours after eating.    Do not snack before going to bed.    Raise Your Head  Raising your head and upper body by 4\" to 6\" helps limit reflux when you re lying down. Put blocks under the head of the bed frame to raise it.                    [id number] [address], [place] [id number]. All rights reserved. This information is not intended as a substitute for professional medical care. Always follow your healthcare professional's instructions.    Hiatal Hernia  The diaphragm is a thin sheet of muscle that runs across the top of the stomach. The small opening in the diaphragm where the esophagus and stomach meet is called the hiatus. When you eat, the muscle around the hiatus relaxes to let food pass from the esophagus into the stomach. The hiatus tightens to keep food and acid in the stomach.  In some people, the hiatus is too large or the muscle around it is weak. Then the top of the stomach can push upward through the hiatus. This is called a hiatal hernia. A hiatal hernia can let stomach acid flow back up the esophagus. This is called acid reflux.  Hiatal hernias are common. The cause of a hiatal hernia is not certain, but some things may make it more likely. These may include obesity, pregnancy, and vomiting or coughing.  Many people with hiatal hernia do not have symptoms. Often the symptoms are like those of GERD or " acid reflux. They can include:    Burning feeling under the breastbone (heartburn)    Other chest discomfort    Frequent burping    Food coming back up into the throat or mouth    Acid taste in the mouth    Trouble swallowing food or liquid    Nighttime choking, coughing, or wheezing    Feeling full after eating only a small amount of food    Nausea and vomiting  Treatment can reduce symptoms. This includes medicines and lifestyle changes. In severe cases, you may need surgery to tighten the hiatus.  Home care  Medicines help control symptoms, but they can't fix the hernia.     Antacids help neutralize the normal acids in your stomach. You may find one works better than another for you.     Acid blockers (H2 blockers) decrease acid production.     Acid inhibitors (PPIs) decrease acid production in a different way than the blockers.     Don't take NSAIDs such as aspirin, ibuprofen, and naproxen. These may worsen symptoms in some people. If you are taking these medicines for another medical problem, talk with your healthcare provider before stopping them.  Lifestyle changes are important in treating your symptoms. You can help reduce or relieve your symptoms if you:    Stop smoking and using tobacco. Also if possible, avoid second-hand smoke.    Lose excess weight. Excess weight puts pressure on the stomach and esophagus.    Symptoms can be worsened by certain foods. Limit or avoid fatty, fried, and spicy foods, as well as coffee, chocolate, mint, and foods with high acid content such as tomatoes and citrus fruit and juices (orange, grapefruit, lemon).     Eat smaller amounts at a time. Don't get overfull.    Don't use alcohol, caffeine, or tobacco. They can delay healing and worsen your symptoms.  Follow-up care  Follow up with your healthcare provider. Regular visits may be needed to check on your health. Be sure to take any medicines as prescribed and keep all your appointments. In some cases, you may need surgery  to stop symptoms. Your healthcare provider will talk with you about this option if needed.  When to seek medical advice  Call your healthcare provider right away if any of these occur:    Severe pain in your chest or abdomen    Can t keep down food or liquid    Symptoms get worse    Other symptoms as indicated by your healthcare provider  Call 911  Call 911 if any of these occur:    Trouble breathing or swallowing    Worsening chest pain, especially if different from usual    Vomiting blood    Large amounts of blood in stool  Date Last Reviewed: 2/1/2018 2000-2018 [employer]. [address] [place] [address], [place] [address]. All rights reserved. This information is not intended as a substitute for professional medical care. Always follow your healthcare professional's instructions.

## 2019-12-16 LAB — COPATH REPORT: NORMAL

## 2020-03-29 DIAGNOSIS — K21.00 GASTROESOPHAGEAL REFLUX DISEASE WITH ESOPHAGITIS: ICD-10-CM

## 2020-03-30 NOTE — TELEPHONE ENCOUNTER
"Routing refill request to provider for review/approval because:  Rx last prescribed by Pastor.    Requested Prescriptions   Pending Prescriptions Disp Refills     pantoprazole (PROTONIX) 40 MG EC tablet [Pharmacy Med Name: Pantoprazole Sodium 40 MG Oral Tablet Delayed Release] 84 tablet 0     Sig: TAKE 1 TABLET BY MOUTH TWICE DAILY BEFORE MEAL(S)       PPI Protocol Failed - 3/30/2020  6:59 AM        Failed - Recent (12 mo) or future (30 days) visit within the authorizing provider's specialty     Patient has had an office visit with the authorizing provider or a provider within the authorizing providers department within the previous 12 mos or has a future within next 30 days. See \"Patient Info\" tab in inbasket, or \"Choose Columns\" in Meds & Orders section of the refill encounter.              Failed - Medication is active on med list        Passed - Not on Clopidogrel (unless Pantoprazole ordered)        Passed - No diagnosis of osteoporosis on record        Passed - Patient is age 18 or older           "

## 2020-04-01 RX ORDER — PANTOPRAZOLE SODIUM 40 MG/1
TABLET, DELAYED RELEASE ORAL
Qty: 84 TABLET | Refills: 0 | OUTPATIENT
Start: 2020-04-01

## 2020-04-01 NOTE — TELEPHONE ENCOUNTER
Called patient however he did not answer and there was no option to leave a message.    Ricardo Varela RN....4/1/2020 11:54 AM

## 2020-04-08 NOTE — TELEPHONE ENCOUNTER
2nd attempt to reach the patient was unsuccesful.  There was no answer and no option to leave a message.  Closing this encounter.    Ricardo Varela RN....4/8/2020 11:18 AM

## 2024-08-05 RX ORDER — PANTOPRAZOLE SODIUM 40 MG/1
1 TABLET, DELAYED RELEASE ORAL DAILY
COMMUNITY
Start: 2023-11-08

## 2024-08-05 RX ORDER — ASPIRIN 81 MG/1
81 TABLET ORAL DAILY
COMMUNITY
Start: 2023-06-08

## 2024-08-05 RX ORDER — PRIMIDONE 50 MG/1
25 TABLET ORAL
COMMUNITY
Start: 2024-07-19 | End: 2025-07-19

## 2024-08-05 RX ORDER — ROSUVASTATIN CALCIUM 40 MG/1
40 TABLET, COATED ORAL DAILY
COMMUNITY
Start: 2023-06-08

## 2024-08-06 DIAGNOSIS — C15.3 MALIGNANT NEOPLASM OF UPPER THIRD OF ESOPHAGUS (H): Primary | ICD-10-CM

## 2024-08-06 RX ORDER — LORAZEPAM 1 MG/1
1 TABLET ORAL DAILY PRN
Qty: 30 TABLET | Refills: 0 | Status: SHIPPED | OUTPATIENT
Start: 2024-08-06

## 2024-08-12 ENCOUNTER — ANESTHESIA (OUTPATIENT)
Dept: SURGERY | Facility: CLINIC | Age: 74
End: 2024-08-12
Payer: COMMERCIAL

## 2024-08-12 ENCOUNTER — HOSPITAL ENCOUNTER (OUTPATIENT)
Facility: CLINIC | Age: 74
Discharge: HOME | End: 2024-08-12
Attending: INTERNAL MEDICINE | Admitting: INTERNAL MEDICINE
Payer: COMMERCIAL

## 2024-08-12 ENCOUNTER — ANESTHESIA EVENT (OUTPATIENT)
Dept: SURGERY | Facility: CLINIC | Age: 74
End: 2024-08-12
Payer: COMMERCIAL

## 2024-08-12 VITALS
WEIGHT: 200 LBS | TEMPERATURE: 97.4 F | RESPIRATION RATE: 20 BRPM | HEART RATE: 63 BPM | OXYGEN SATURATION: 92 % | SYSTOLIC BLOOD PRESSURE: 130 MMHG | BODY MASS INDEX: 27.09 KG/M2 | HEIGHT: 72 IN | DIASTOLIC BLOOD PRESSURE: 74 MMHG

## 2024-08-12 LAB — UPPER EUS: NORMAL

## 2024-08-12 PROCEDURE — 370N000017 HC ANESTHESIA TECHNICAL FEE, PER MIN: Performed by: INTERNAL MEDICINE

## 2024-08-12 PROCEDURE — 258N000003 HC RX IP 258 OP 636: Performed by: ANESTHESIOLOGY

## 2024-08-12 PROCEDURE — 88312 SPECIAL STAINS GROUP 1: CPT | Mod: TC | Performed by: INTERNAL MEDICINE

## 2024-08-12 PROCEDURE — 710N000012 HC RECOVERY PHASE 2, PER MINUTE: Performed by: INTERNAL MEDICINE

## 2024-08-12 PROCEDURE — 250N000011 HC RX IP 250 OP 636: Performed by: NURSE ANESTHETIST, CERTIFIED REGISTERED

## 2024-08-12 PROCEDURE — 360N000076 HC SURGERY LEVEL 3, PER MIN: Performed by: INTERNAL MEDICINE

## 2024-08-12 PROCEDURE — 88312 SPECIAL STAINS GROUP 1: CPT | Mod: 26 | Performed by: PATHOLOGY

## 2024-08-12 PROCEDURE — 272N000001 HC OR GENERAL SUPPLY STERILE: Performed by: INTERNAL MEDICINE

## 2024-08-12 PROCEDURE — 88305 TISSUE EXAM BY PATHOLOGIST: CPT | Mod: 26 | Performed by: PATHOLOGY

## 2024-08-12 PROCEDURE — 88305 TISSUE EXAM BY PATHOLOGIST: CPT | Mod: TC | Performed by: INTERNAL MEDICINE

## 2024-08-12 PROCEDURE — 250N000009 HC RX 250: Performed by: NURSE ANESTHETIST, CERTIFIED REGISTERED

## 2024-08-12 PROCEDURE — 999N000141 HC STATISTIC PRE-PROCEDURE NURSING ASSESSMENT: Performed by: INTERNAL MEDICINE

## 2024-08-12 RX ORDER — HYDROMORPHONE HCL IN WATER/PF 6 MG/30 ML
0.2 PATIENT CONTROLLED ANALGESIA SYRINGE INTRAVENOUS EVERY 5 MIN PRN
Status: DISCONTINUED | OUTPATIENT
Start: 2024-08-12 | End: 2024-08-12 | Stop reason: HOSPADM

## 2024-08-12 RX ORDER — NALOXONE HYDROCHLORIDE 0.4 MG/ML
0.1 INJECTION, SOLUTION INTRAMUSCULAR; INTRAVENOUS; SUBCUTANEOUS
Status: DISCONTINUED | OUTPATIENT
Start: 2024-08-12 | End: 2024-08-12 | Stop reason: HOSPADM

## 2024-08-12 RX ORDER — FENTANYL CITRATE 50 UG/ML
25 INJECTION, SOLUTION INTRAMUSCULAR; INTRAVENOUS EVERY 5 MIN PRN
Status: DISCONTINUED | OUTPATIENT
Start: 2024-08-12 | End: 2024-08-12 | Stop reason: HOSPADM

## 2024-08-12 RX ORDER — DEXAMETHASONE SODIUM PHOSPHATE 4 MG/ML
4 INJECTION, SOLUTION INTRA-ARTICULAR; INTRALESIONAL; INTRAMUSCULAR; INTRAVENOUS; SOFT TISSUE
Status: DISCONTINUED | OUTPATIENT
Start: 2024-08-12 | End: 2024-08-12 | Stop reason: HOSPADM

## 2024-08-12 RX ORDER — ONDANSETRON 2 MG/ML
4 INJECTION INTRAMUSCULAR; INTRAVENOUS EVERY 30 MIN PRN
Status: DISCONTINUED | OUTPATIENT
Start: 2024-08-12 | End: 2024-08-12 | Stop reason: HOSPADM

## 2024-08-12 RX ORDER — SODIUM CHLORIDE, SODIUM LACTATE, POTASSIUM CHLORIDE, CALCIUM CHLORIDE 600; 310; 30; 20 MG/100ML; MG/100ML; MG/100ML; MG/100ML
INJECTION, SOLUTION INTRAVENOUS CONTINUOUS
Status: DISCONTINUED | OUTPATIENT
Start: 2024-08-12 | End: 2024-08-12 | Stop reason: HOSPADM

## 2024-08-12 RX ORDER — KETAMINE HYDROCHLORIDE 10 MG/ML
INJECTION INTRAMUSCULAR; INTRAVENOUS PRN
Status: DISCONTINUED | OUTPATIENT
Start: 2024-08-12 | End: 2024-08-12

## 2024-08-12 RX ORDER — ACETAMINOPHEN 500 MG
1000 TABLET ORAL EVERY 6 HOURS PRN
COMMUNITY

## 2024-08-12 RX ORDER — NALOXONE HYDROCHLORIDE 0.4 MG/ML
0.4 INJECTION, SOLUTION INTRAMUSCULAR; INTRAVENOUS; SUBCUTANEOUS
Status: DISCONTINUED | OUTPATIENT
Start: 2024-08-12 | End: 2024-08-12 | Stop reason: HOSPADM

## 2024-08-12 RX ORDER — GLYCOPYRROLATE 0.2 MG/ML
INJECTION, SOLUTION INTRAMUSCULAR; INTRAVENOUS PRN
Status: DISCONTINUED | OUTPATIENT
Start: 2024-08-12 | End: 2024-08-12

## 2024-08-12 RX ORDER — PROPOFOL 10 MG/ML
INJECTION, EMULSION INTRAVENOUS PRN
Status: DISCONTINUED | OUTPATIENT
Start: 2024-08-12 | End: 2024-08-12

## 2024-08-12 RX ORDER — FENTANYL CITRATE 50 UG/ML
50 INJECTION, SOLUTION INTRAMUSCULAR; INTRAVENOUS EVERY 5 MIN PRN
Status: DISCONTINUED | OUTPATIENT
Start: 2024-08-12 | End: 2024-08-12 | Stop reason: HOSPADM

## 2024-08-12 RX ORDER — HYDROMORPHONE HCL IN WATER/PF 6 MG/30 ML
0.4 PATIENT CONTROLLED ANALGESIA SYRINGE INTRAVENOUS EVERY 5 MIN PRN
Status: DISCONTINUED | OUTPATIENT
Start: 2024-08-12 | End: 2024-08-12 | Stop reason: HOSPADM

## 2024-08-12 RX ORDER — OXYCODONE HYDROCHLORIDE 5 MG/1
10 TABLET ORAL
Status: DISCONTINUED | OUTPATIENT
Start: 2024-08-12 | End: 2024-08-12 | Stop reason: HOSPADM

## 2024-08-12 RX ORDER — NALOXONE HYDROCHLORIDE 0.4 MG/ML
0.2 INJECTION, SOLUTION INTRAMUSCULAR; INTRAVENOUS; SUBCUTANEOUS
Status: DISCONTINUED | OUTPATIENT
Start: 2024-08-12 | End: 2024-08-12 | Stop reason: HOSPADM

## 2024-08-12 RX ORDER — OXYCODONE HYDROCHLORIDE 5 MG/1
5 TABLET ORAL
Status: DISCONTINUED | OUTPATIENT
Start: 2024-08-12 | End: 2024-08-12 | Stop reason: HOSPADM

## 2024-08-12 RX ORDER — FLUMAZENIL 0.1 MG/ML
0.2 INJECTION, SOLUTION INTRAVENOUS
Status: DISCONTINUED | OUTPATIENT
Start: 2024-08-12 | End: 2024-08-12 | Stop reason: HOSPADM

## 2024-08-12 RX ORDER — ONDANSETRON 4 MG/1
4 TABLET, ORALLY DISINTEGRATING ORAL EVERY 30 MIN PRN
Status: DISCONTINUED | OUTPATIENT
Start: 2024-08-12 | End: 2024-08-12 | Stop reason: HOSPADM

## 2024-08-12 RX ORDER — LIDOCAINE 40 MG/G
CREAM TOPICAL
Status: DISCONTINUED | OUTPATIENT
Start: 2024-08-12 | End: 2024-08-12 | Stop reason: HOSPADM

## 2024-08-12 RX ORDER — CYCLOBENZAPRINE HCL 10 MG
10 TABLET ORAL AT BEDTIME
COMMUNITY
Start: 2024-08-08

## 2024-08-12 RX ORDER — ONDANSETRON 2 MG/ML
4 INJECTION INTRAMUSCULAR; INTRAVENOUS EVERY 6 HOURS PRN
Status: DISCONTINUED | OUTPATIENT
Start: 2024-08-12 | End: 2024-08-12 | Stop reason: HOSPADM

## 2024-08-12 RX ORDER — ONDANSETRON 2 MG/ML
INJECTION INTRAMUSCULAR; INTRAVENOUS PRN
Status: DISCONTINUED | OUTPATIENT
Start: 2024-08-12 | End: 2024-08-12

## 2024-08-12 RX ORDER — FENTANYL CITRATE 50 UG/ML
25 INJECTION, SOLUTION INTRAMUSCULAR; INTRAVENOUS
Status: DISCONTINUED | OUTPATIENT
Start: 2024-08-12 | End: 2024-08-12 | Stop reason: HOSPADM

## 2024-08-12 RX ORDER — ONDANSETRON 4 MG/1
4 TABLET, ORALLY DISINTEGRATING ORAL EVERY 6 HOURS PRN
Status: DISCONTINUED | OUTPATIENT
Start: 2024-08-12 | End: 2024-08-12 | Stop reason: HOSPADM

## 2024-08-12 RX ORDER — LIDOCAINE HYDROCHLORIDE 20 MG/ML
INJECTION, SOLUTION INFILTRATION; PERINEURAL PRN
Status: DISCONTINUED | OUTPATIENT
Start: 2024-08-12 | End: 2024-08-12

## 2024-08-12 RX ADMIN — ONDANSETRON 4 MG: 2 INJECTION INTRAMUSCULAR; INTRAVENOUS at 13:46

## 2024-08-12 RX ADMIN — SODIUM CHLORIDE, POTASSIUM CHLORIDE, SODIUM LACTATE AND CALCIUM CHLORIDE: 600; 310; 30; 20 INJECTION, SOLUTION INTRAVENOUS at 12:56

## 2024-08-12 RX ADMIN — LIDOCAINE HYDROCHLORIDE 40 MG: 20 INJECTION, SOLUTION INFILTRATION; PERINEURAL at 13:38

## 2024-08-12 RX ADMIN — GLYCOPYRROLATE 0.2 MG: 0.2 INJECTION, SOLUTION INTRAMUSCULAR; INTRAVENOUS at 13:08

## 2024-08-12 RX ADMIN — PROPOFOL 150 MCG/KG/MIN: 10 INJECTION, EMULSION INTRAVENOUS at 13:12

## 2024-08-12 RX ADMIN — MIDAZOLAM 2 MG: 1 INJECTION INTRAMUSCULAR; INTRAVENOUS at 13:08

## 2024-08-12 RX ADMIN — SODIUM CHLORIDE, POTASSIUM CHLORIDE, SODIUM LACTATE AND CALCIUM CHLORIDE: 600; 310; 30; 20 INJECTION, SOLUTION INTRAVENOUS at 13:08

## 2024-08-12 RX ADMIN — Medication 20 MG: at 13:12

## 2024-08-12 ASSESSMENT — ACTIVITIES OF DAILY LIVING (ADL)
ADLS_ACUITY_SCORE: 34

## 2024-08-12 NOTE — ANESTHESIA CARE TRANSFER NOTE
Patient: Olayinka Baltazar    Procedure: Procedure(s):  esophagogastroduodenoscopy with biopsies  Endoscopic ultrasound upper gastrointestinal tract (GI)  with fine needle aspiration       Diagnosis: Esophageal cancer (H) [C15.9]  Diagnosis Additional Information: No value filed.    Anesthesia Type:   MAC     Note:    Oropharynx: oropharynx clear of all foreign objects  Level of Consciousness: awake  Oxygen Supplementation: room air    Independent Airway: airway patency satisfactory and stable  Dentition: dentition unchanged  Vital Signs Stable: post-procedure vital signs reviewed and stable  Report to RN Given: handoff report given  Patient transferred to: Phase II    Handoff Report: Identifed the Patient, Identified the Reponsible Provider, Reviewed the pertinent medical history, Discussed the surgical course, Reviewed Intra-OP anesthesia mangement and issues during anesthesia, Set expectations for post-procedure period and Allowed opportunity for questions and acknowledgement of understanding      Vitals:  Vitals Value Taken Time   /61 08/12/24 1413   Temp     Pulse 76 08/12/24 1413   Resp     SpO2 95 % 08/12/24 1415   Vitals shown include unfiled device data.    Electronically Signed By: ASHANTI Augustin CRNA  August 12, 2024  2:16 PM

## 2024-08-12 NOTE — ANESTHESIA PREPROCEDURE EVALUATION
Anesthesia Pre-Procedure Evaluation    Patient: Olayinka Baltazar   MRN: [id number] : 1950        Procedure : Procedure(s):  Endoscopic Ultrasound with Fine Needle Aspiration          Past Medical History:   Diagnosis Date    Gastroesophageal reflux disease     Malignant neoplasm (H)     oral    Sleep apnea     Tremors       Past Surgical History:   Procedure Laterality Date    ESOPHAGOSCOPY, GASTROSCOPY, DUODENOSCOPY (EGD), COMBINED N/A 2015    Procedure: COMBINED ESOPHAGOSCOPY, GASTROSCOPY, DUODENOSCOPY (EGD), BIOPSY SINGLE OR MULTIPLE;  Surgeon: Celestino Shannon MD;  Location:  GI    ESOPHAGOSCOPY, GASTROSCOPY, DUODENOSCOPY (EGD), COMBINED  2019    Dr. Mccrary [facility]    ESOPHAGOSCOPY, GASTROSCOPY, DUODENOSCOPY (EGD), COMBINED N/A 2019    Procedure: ESOPHAGOGASTRODUODENOSCOPY, WITH BIOPSY;  Surgeon: Prem Baumann MD;  Location:  GI    MOUTH SURGERY      removal mouth ca    ORTHOPEDIC SURGERY      SPLENECTOMY  1966    TONSILLECTOMY        Allergies   Allergen Reactions    [place] [Nuts] Anaphylaxis      Social History     Tobacco Use    Smoking status: Every Day     Current packs/day: 0.50     Average packs/day: 0.5 packs/day for 40.0 years (20.0 ttl pk-yrs)     Types: Cigarettes    Smokeless tobacco: Never   Substance Use Topics    Alcohol use: No     Comment: 3/9/2009 - quit      Wt Readings from Last 1 Encounters:   24 90.7 kg (200 lb)        Anesthesia Evaluation            ROS/MED HX  ENT/Pulmonary:     (+) sleep apnea, mild, doesn't use CPAP,                                      Neurologic:       Cardiovascular:       METS/Exercise Tolerance:     Hematologic:       Musculoskeletal:       GI/Hepatic:     (+) GERD, Asymptomatic on medication,                  Renal/Genitourinary:       Endo:       Psychiatric/Substance Use:       Infectious Disease:       Malignancy:   (+) Malignancy, History of GI.    Other:            Physical Exam    Airway       "  Mallampati: II   TM distance: > 3 FB   Neck ROM: full   Mouth opening: > 3 cm    Respiratory Devices and Support         Dental       (+) Minor Abnormalities - some fillings, tiny chips      Cardiovascular   cardiovascular exam normal          Pulmonary   pulmonary exam normal                OUTSIDE LABS:  CBC:   Lab Results   Component Value Date    WBC 7.8 02/06/2019    HGB 14.2 02/06/2019    HCT 43.4 02/06/2019     02/06/2019     BMP:   Lab Results   Component Value Date     02/06/2019    POTASSIUM 3.9 02/06/2019    CHLORIDE 110 (H) 02/06/2019    CO2 29 02/06/2019    BUN 20 02/06/2019    CR 0.90 02/06/2019    GLC 96 02/06/2019     COAGS: No results found for: \"PTT\", \"INR\", \"FIBR\"  POC: No results found for: \"BGM\", \"HCG\", \"HCGS\"  HEPATIC: No results found for: \"ALBUMIN\", \"PROTTOTAL\", \"ALT\", \"AST\", \"GGT\", \"ALKPHOS\", \"BILITOTAL\", \"BILIDIRECT\", \"KATIE\"  OTHER:   Lab Results   Component Value Date    MARIAJOSE 8.5 02/06/2019       Anesthesia Plan    ASA Status:  3       Anesthesia Type: MAC.     - Reason for MAC: immobility needed   Induction: Propofol.   Maintenance: TIVA.        Consents    Anesthesia Plan(s) and associated risks, benefits, and realistic alternatives discussed. Questions answered and patient/representative(s) expressed understanding.     - Discussed:     - Discussed with:  Patient      - Extended Intubation/Ventilatory Support Discussed: No.      - Patient is DNR/DNI Status: No     Use of blood products discussed: No .     Postoperative Care    Pain management: IV analgesics.   PONV prophylaxis: Ondansetron (or other 5HT-3), Dexamethasone or Solumedrol     Comments:               Cy King MD    I have reviewed the pertinent notes and labs in the chart from the past 30 days and (re)examined the patient.  Any updates or changes from those notes are reflected in this note.             # Drug Induced Platelet Defect: home medication list includes an antiplatelet medication  # Overweight: " "Estimated body mass index is 27.39 kg/m  as calculated from the following:    Height as of this encounter: 1.82 m (5' 11.65\").    Weight as of this encounter: 90.7 kg (200 lb).      "

## 2024-08-12 NOTE — ANESTHESIA POSTPROCEDURE EVALUATION
Patient: Olayinka Baltazar    Procedure: Procedure(s):  esophagogastroduodenoscopy with biopsies  Endoscopic ultrasound upper gastrointestinal tract (GI)  with fine needle aspiration       Anesthesia Type:  MAC    Note:  Disposition: Outpatient   Postop Pain Control: Uneventful            Sign Out: Well controlled pain   PONV: No   Neuro/Psych: Uneventful            Sign Out: Acceptable/Baseline neuro status   Airway/Respiratory: Uneventful            Sign Out: Acceptable/Baseline resp. status   CV/Hemodynamics: Uneventful            Sign Out: Acceptable CV status; No obvious hypovolemia; No obvious fluid overload   Other NRE: NONE   DID A NON-ROUTINE EVENT OCCUR? No           Last vitals:  Vitals Value Taken Time   /67 08/12/24 1445   Temp 98.5  F (36.9  C) 08/12/24 1416   Pulse 64 08/12/24 1445   Resp 18 08/12/24 1431   SpO2 99 % 08/12/24 1454   Vitals shown include unfiled device data.    Electronically Signed By: Robert Mcginnis MD  August 12, 2024  2:55 PM

## 2024-08-14 LAB
PATH REPORT.COMMENTS IMP SPEC: ABNORMAL
PATH REPORT.COMMENTS IMP SPEC: YES
PATH REPORT.FINAL DX SPEC: ABNORMAL
PATH REPORT.GROSS SPEC: ABNORMAL
PATH REPORT.MICROSCOPIC SPEC OTHER STN: ABNORMAL

## 2024-08-14 PROCEDURE — 88172 CYTP DX EVAL FNA 1ST EA SITE: CPT | Mod: 26 | Performed by: PATHOLOGY

## 2024-08-14 PROCEDURE — 88173 CYTOPATH EVAL FNA REPORT: CPT | Mod: 26 | Performed by: PATHOLOGY

## 2024-08-14 PROCEDURE — 88305 TISSUE EXAM BY PATHOLOGIST: CPT | Mod: 26 | Performed by: PATHOLOGY

## 2024-08-15 LAB
PATH REPORT.COMMENTS IMP SPEC: NORMAL
PATH REPORT.FINAL DX SPEC: NORMAL
PATH REPORT.GROSS SPEC: NORMAL
PATH REPORT.MICROSCOPIC SPEC OTHER STN: NORMAL
PATH REPORT.MICROSCOPIC SPEC OTHER STN: NORMAL
PATH REPORT.RELEVANT HX SPEC: NORMAL
PHOTO IMAGE: NORMAL

## 2024-09-03 ENCOUNTER — HOSPITAL ENCOUNTER (EMERGENCY)
Facility: CLINIC | Age: 74
Discharge: HOME | End: 2024-09-03
Attending: EMERGENCY MEDICINE | Admitting: EMERGENCY MEDICINE
Payer: COMMERCIAL

## 2024-09-03 VITALS
WEIGHT: 189.6 LBS | DIASTOLIC BLOOD PRESSURE: 69 MMHG | HEIGHT: 71 IN | HEART RATE: 79 BPM | OXYGEN SATURATION: 92 % | BODY MASS INDEX: 26.54 KG/M2 | SYSTOLIC BLOOD PRESSURE: 136 MMHG | TEMPERATURE: 97.1 F | RESPIRATION RATE: 16 BRPM

## 2024-09-03 DIAGNOSIS — C15.3 MALIGNANT NEOPLASM OF UPPER THIRD OF ESOPHAGUS (H): Primary | ICD-10-CM

## 2024-09-03 DIAGNOSIS — C15.3 MALIGNANT NEOPLASM OF UPPER THIRD OF ESOPHAGUS (H): ICD-10-CM

## 2024-09-03 DIAGNOSIS — E86.0 DEHYDRATION: ICD-10-CM

## 2024-09-03 LAB
ANION GAP SERPL CALCULATED.3IONS-SCNC: 13 MMOL/L (ref 7–15)
BASOPHILS # BLD AUTO: 0 10E3/UL (ref 0–0.2)
BASOPHILS NFR BLD AUTO: 1 %
BUN SERPL-MCNC: 16.7 MG/DL (ref 8–23)
CALCIUM SERPL-MCNC: 9 MG/DL (ref 8.8–10.4)
CHLORIDE SERPL-SCNC: 104 MMOL/L (ref 98–107)
CREAT SERPL-MCNC: 0.92 MG/DL (ref 0.67–1.17)
EGFRCR SERPLBLD CKD-EPI 2021: 88 ML/MIN/1.73M2
EOSINOPHIL # BLD AUTO: 0 10E3/UL (ref 0–0.7)
EOSINOPHIL NFR BLD AUTO: 1 %
ERYTHROCYTE [DISTWIDTH] IN BLOOD BY AUTOMATED COUNT: 13.2 % (ref 10–15)
GLUCOSE SERPL-MCNC: 110 MG/DL (ref 70–99)
HCO3 SERPL-SCNC: 21 MMOL/L (ref 22–29)
HCT VFR BLD AUTO: 43.5 % (ref 40–53)
HGB BLD-MCNC: 14.3 G/DL (ref 13.3–17.7)
IMM GRANULOCYTES # BLD: 0 10E3/UL
IMM GRANULOCYTES NFR BLD: 1 %
LYMPHOCYTES # BLD AUTO: 0.4 10E3/UL (ref 0.8–5.3)
LYMPHOCYTES NFR BLD AUTO: 16 %
MCH RBC QN AUTO: 31.8 PG (ref 26.5–33)
MCHC RBC AUTO-ENTMCNC: 32.9 G/DL (ref 31.5–36.5)
MCV RBC AUTO: 97 FL (ref 78–100)
MONOCYTES # BLD AUTO: 0.3 10E3/UL (ref 0–1.3)
MONOCYTES NFR BLD AUTO: 11 %
NEUTROPHILS # BLD AUTO: 1.7 10E3/UL (ref 1.6–8.3)
NEUTROPHILS NFR BLD AUTO: 71 %
NRBC # BLD AUTO: 0 10E3/UL
NRBC BLD AUTO-RTO: 0 /100
PLATELET # BLD AUTO: 210 10E3/UL (ref 150–450)
POTASSIUM SERPL-SCNC: 4.1 MMOL/L (ref 3.4–5.3)
RBC # BLD AUTO: 4.49 10E6/UL (ref 4.4–5.9)
SODIUM SERPL-SCNC: 138 MMOL/L (ref 135–145)
WBC # BLD AUTO: 2.4 10E3/UL (ref 4–11)

## 2024-09-03 PROCEDURE — 80048 BASIC METABOLIC PNL TOTAL CA: CPT | Performed by: EMERGENCY MEDICINE

## 2024-09-03 PROCEDURE — 96361 HYDRATE IV INFUSION ADD-ON: CPT

## 2024-09-03 PROCEDURE — 96360 HYDRATION IV INFUSION INIT: CPT

## 2024-09-03 PROCEDURE — 99283 EMERGENCY DEPT VISIT LOW MDM: CPT | Mod: 25

## 2024-09-03 PROCEDURE — 85048 AUTOMATED LEUKOCYTE COUNT: CPT | Performed by: EMERGENCY MEDICINE

## 2024-09-03 PROCEDURE — 36415 COLL VENOUS BLD VENIPUNCTURE: CPT | Performed by: EMERGENCY MEDICINE

## 2024-09-03 PROCEDURE — 258N000003 HC RX IP 258 OP 636: Performed by: EMERGENCY MEDICINE

## 2024-09-03 RX ORDER — OXYCODONE HYDROCHLORIDE 5 MG/1
5-10 TABLET ORAL EVERY 6 HOURS PRN
Qty: 60 TABLET | Refills: 0 | Status: SHIPPED | OUTPATIENT
Start: 2024-09-03

## 2024-09-03 RX ADMIN — SODIUM CHLORIDE 1000 ML: 9 INJECTION, SOLUTION INTRAVENOUS at 10:35

## 2024-09-03 RX ADMIN — SODIUM CHLORIDE 1000 ML: 9 INJECTION, SOLUTION INTRAVENOUS at 09:35

## 2024-09-03 ASSESSMENT — ACTIVITIES OF DAILY LIVING (ADL)
ADLS_ACUITY_SCORE: 38
ADLS_ACUITY_SCORE: 38
ADLS_ACUITY_SCORE: 36

## 2024-09-03 ASSESSMENT — COLUMBIA-SUICIDE SEVERITY RATING SCALE - C-SSRS
2. HAVE YOU ACTUALLY HAD ANY THOUGHTS OF KILLING YOURSELF IN THE PAST MONTH?: NO
6. HAVE YOU EVER DONE ANYTHING, STARTED TO DO ANYTHING, OR PREPARED TO DO ANYTHING TO END YOUR LIFE?: NO
1. IN THE PAST MONTH, HAVE YOU WISHED YOU WERE DEAD OR WISHED YOU COULD GO TO SLEEP AND NOT WAKE UP?: NO

## 2024-09-03 NOTE — DISCHARGE INSTRUCTIONS
It was a pleasure taking care of you today. I hope you feel much better soon.  Please contact your oncology team regarding potential feeding tube placement.  Please follow-up with your primary care doctor in 3-5 days.  Return immediately for weakness, fever, or any other concerns.

## 2024-09-03 NOTE — ED PROVIDER NOTES
"  Emergency Department Note      History of Present Illness     Chief Complaint   Dehydration      HPI   Olayinka Baltazar is a 73 year old male with a past medical history of GERD, hyperlipidemia, prostate tumor, esophageal cancer, prediabetes who presents with dehydration. The patient states that he was sent in by his oncology team because of severe dehydration. The patient had their blood pressure today and when going from sitting to standing it changed a lot so was sent in. The patient has not ate anything for 4-5 days and is drinking minimal fluids. The patient denied to have a food port placed previously due to throat cancer. The patient has lost 30 pounds in the past 6 weeks. 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 The patient has not had a fever but wife noticed red streaks on the patients face.    Independent Historian   Wife as detailed above.    Review of External Notes   Reviewed 8/8/2024 family medicine office visit    Past Medical History     Medical History and Problem List   GERD  Sleep apnea  Tremors  Abdominal aortic aneurysm  Asplenia  Hodgson's esophagus  BPH  Hiatal hernia  Hyperlipidemia  Prostate tumor  Prediabetes  Tumor of esophagus    Medications   aspirin 81 MG   cyclobenzaprine   LORazepam   pantoprazole   primidone   propranolol   rosuvastatin     Surgical History   EGD  Mouth surgery (cancer)  Splenectomy  Tonsillectomy  Prostatectomy  Tooth extraction  Back surgery    Physical Exam     Patient Vitals for the past 24 hrs:   BP Temp Temp src Pulse Resp SpO2 Height Weight   09/03/24 1154 -- -- -- -- 16 -- -- --   09/03/24 1148 -- -- -- -- -- 92 % -- --   09/03/24 1138 -- -- -- -- -- 95 % -- --   09/03/24 1133 136/69 -- -- -- -- -- -- --   09/03/24 0943 -- -- -- -- -- 94 % -- --   09/03/24 0938 123/80 -- -- 79 -- 93 % -- --   09/03/24 0833 134/84 97.1  F (36.2  C) Temporal 89 16 96 % 1.803 m (5' 11\") 86 kg (189 lb 9.5 oz)     Physical Exam  Constitutional: Alert, " attentive  HENT:    Nose: Nose normal.    Mouth/Throat: Oropharynx is clear, mucous membranes are moist   Eyes: EOM are normal.   CV: regular rate and rhythm; no murmurs, rubs or gallups  Chest: Effort normal and breath sounds normal.   GI:  There is no tenderness. No distension. Normal bowel sounds  MSK: Normal range of motion.   Neurological: Alert, attentive  Skin: Skin is warm and dry.      Diagnostics     Lab Results   Labs Ordered and Resulted from Time of ED Arrival to Time of ED Departure   BASIC METABOLIC PANEL - Abnormal       Result Value    Sodium 138      Potassium 4.1      Chloride 104      Carbon Dioxide (CO2) 21 (*)     Anion Gap 13      Urea Nitrogen 16.7      Creatinine 0.92      GFR Estimate 88      Calcium 9.0      Glucose 110 (*)    CBC WITH PLATELETS AND DIFFERENTIAL - Abnormal    WBC Count 2.4 (*)     RBC Count 4.49      Hemoglobin 14.3      Hematocrit 43.5      MCV 97      MCH 31.8      MCHC 32.9      RDW 13.2      Platelet Count 210      % Neutrophils 71      % Lymphocytes 16      % Monocytes 11      % Eosinophils 1      % Basophils 1      % Immature Granulocytes 1      NRBCs per 100 WBC 0      Absolute Neutrophils 1.7      Absolute Lymphocytes 0.4 (*)     Absolute Monocytes 0.3      Absolute Eosinophils 0.0      Absolute Basophils 0.0      Absolute Immature Granulocytes 0.0      Absolute NRBCs 0.0       Independent Interpretation   None    ED Course      Medications Administered   Medications   sodium chloride 0.9% BOLUS 1,000 mL (0 mLs Intravenous Stopped 9/3/24 1036)   sodium chloride 0.9% BOLUS 1,000 mL (0 mLs Intravenous Stopped 9/3/24 1148)     Discussion of Management   None    ED Course   ED Course as of 09/03/24 1357   Tue Sep 03, 2024   0988 Obtained the patients history and performed initial exam     Medical Decision Making / Diagnosis     CMS Diagnoses: None    MIPS       None    [facility]   Olayinka Baltazar is a 73 year old male with history of esophageal cancer who presents for  evaluation of concern for dehydration in the context of poor p.o. intake.  He is chronically ill-appearing but has normal vitals.  Screening electrolytes are reassuring and he feels improved after fluids.  Family have a plan to pursue a feeding tube as an outpatient, which she previously declined.  Recommended oncology follow-up for further discussion of the same and I spoke with the oncology nurse regarding this, as this cannot be facilitated via the ER today.  Plan oncology follow-up in the next 1 to 2 days return precautions for any concerns.    Disposition   The patient was discharged.     Diagnosis     ICD-10-CM    1. Dehydration  E86.0       2. Malignant neoplasm of upper third of esophagus (H)  C15.3            Discharge Medications   Discharge Medication List as of 9/3/2024 11:48 AM          Scribe Disclosure:  STACY, Yosef Phelan, am serving as a scribe at 9:04 AM on 9/3/2024 to document services personally performed by Ga Morales MD based on my observations and the provider's statements to me.        Ga Morales MD  09/03/24 2113

## 2024-09-03 NOTE — ED TRIAGE NOTES
Pt here with c/o decreased PO intake x 5 days. Referred here for IVF from oncology team. Last chemo Wed, last radiation Fri. Throat cancer. No port access. No fevers or vomiting. ABC intact. A&Ox4.

## 2024-11-03 ENCOUNTER — ANESTHESIA EVENT (OUTPATIENT)
Dept: GASTROENTEROLOGY | Facility: CLINIC | Age: 74
End: 2024-11-03
Payer: COMMERCIAL

## 2024-11-04 ENCOUNTER — HOSPITAL ENCOUNTER (OUTPATIENT)
Facility: CLINIC | Age: 74
Discharge: HOME | End: 2024-11-04
Attending: INTERNAL MEDICINE | Admitting: INTERNAL MEDICINE
Payer: COMMERCIAL

## 2024-11-04 ENCOUNTER — ANESTHESIA (OUTPATIENT)
Dept: GASTROENTEROLOGY | Facility: CLINIC | Age: 74
End: 2024-11-04
Payer: COMMERCIAL

## 2024-11-04 VITALS
HEART RATE: 71 BPM | SYSTOLIC BLOOD PRESSURE: 109 MMHG | DIASTOLIC BLOOD PRESSURE: 68 MMHG | BODY MASS INDEX: 25.34 KG/M2 | HEIGHT: 71 IN | WEIGHT: 181 LBS | RESPIRATION RATE: 38 BRPM | OXYGEN SATURATION: 94 %

## 2024-11-04 LAB — UPPER GI ENDOSCOPY: NORMAL

## 2024-11-04 PROCEDURE — 999N000010 HC STATISTIC ANES STAT CODE-CRNA PER MINUTE: Performed by: INTERNAL MEDICINE

## 2024-11-04 PROCEDURE — 250N000011 HC RX IP 250 OP 636: Performed by: NURSE ANESTHETIST, CERTIFIED REGISTERED

## 2024-11-04 PROCEDURE — 258N000003 HC RX IP 258 OP 636: Performed by: NURSE ANESTHETIST, CERTIFIED REGISTERED

## 2024-11-04 PROCEDURE — 250N000009 HC RX 250: Performed by: NURSE ANESTHETIST, CERTIFIED REGISTERED

## 2024-11-04 PROCEDURE — 43239 EGD BIOPSY SINGLE/MULTIPLE: CPT | Performed by: NURSE ANESTHETIST, CERTIFIED REGISTERED

## 2024-11-04 PROCEDURE — 370N000017 HC ANESTHESIA TECHNICAL FEE, PER MIN: Performed by: INTERNAL MEDICINE

## 2024-11-04 PROCEDURE — 43239 EGD BIOPSY SINGLE/MULTIPLE: CPT | Performed by: ANESTHESIOLOGY

## 2024-11-04 PROCEDURE — 88305 TISSUE EXAM BY PATHOLOGIST: CPT | Mod: TC | Performed by: INTERNAL MEDICINE

## 2024-11-04 PROCEDURE — 99100 ANES PT EXTEME AGE<1 YR&>70: CPT | Performed by: NURSE ANESTHETIST, CERTIFIED REGISTERED

## 2024-11-04 PROCEDURE — 43239 EGD BIOPSY SINGLE/MULTIPLE: CPT | Performed by: INTERNAL MEDICINE

## 2024-11-04 RX ORDER — PROPOFOL 10 MG/ML
INJECTION, EMULSION INTRAVENOUS PRN
Status: DISCONTINUED | OUTPATIENT
Start: 2024-11-04 | End: 2024-11-04

## 2024-11-04 RX ORDER — SODIUM CHLORIDE 9 MG/ML
INJECTION, SOLUTION INTRAVENOUS CONTINUOUS PRN
Status: DISCONTINUED | OUTPATIENT
Start: 2024-11-04 | End: 2024-11-04

## 2024-11-04 RX ORDER — DEXMEDETOMIDINE HYDROCHLORIDE 4 UG/ML
INJECTION, SOLUTION INTRAVENOUS PRN
Status: DISCONTINUED | OUTPATIENT
Start: 2024-11-04 | End: 2024-11-04

## 2024-11-04 RX ORDER — PROPOFOL 10 MG/ML
INJECTION, EMULSION INTRAVENOUS CONTINUOUS PRN
Status: DISCONTINUED | OUTPATIENT
Start: 2024-11-04 | End: 2024-11-04

## 2024-11-04 RX ORDER — LIDOCAINE HYDROCHLORIDE 20 MG/ML
INJECTION, SOLUTION INFILTRATION; PERINEURAL PRN
Status: DISCONTINUED | OUTPATIENT
Start: 2024-11-04 | End: 2024-11-04

## 2024-11-04 RX ORDER — GLYCOPYRROLATE 0.2 MG/ML
INJECTION, SOLUTION INTRAMUSCULAR; INTRAVENOUS PRN
Status: DISCONTINUED | OUTPATIENT
Start: 2024-11-04 | End: 2024-11-04

## 2024-11-04 RX ORDER — ONDANSETRON 2 MG/ML
INJECTION INTRAMUSCULAR; INTRAVENOUS PRN
Status: DISCONTINUED | OUTPATIENT
Start: 2024-11-04 | End: 2024-11-04

## 2024-11-04 RX ADMIN — PROPOFOL 30 MG: 10 INJECTION, EMULSION INTRAVENOUS at 10:01

## 2024-11-04 RX ADMIN — SODIUM CHLORIDE: 9 INJECTION, SOLUTION INTRAVENOUS at 09:51

## 2024-11-04 RX ADMIN — LIDOCAINE HYDROCHLORIDE 80 MG: 20 INJECTION, SOLUTION INFILTRATION; PERINEURAL at 09:54

## 2024-11-04 RX ADMIN — GLYCOPYRROLATE 0.2 MG: 0.2 INJECTION, SOLUTION INTRAMUSCULAR; INTRAVENOUS at 09:56

## 2024-11-04 RX ADMIN — PROPOFOL 150 MCG/KG/MIN: 10 INJECTION, EMULSION INTRAVENOUS at 09:53

## 2024-11-04 RX ADMIN — ONDANSETRON 4 MG: 2 INJECTION INTRAMUSCULAR; INTRAVENOUS at 09:56

## 2024-11-04 RX ADMIN — DEXMEDETOMIDINE HYDROCHLORIDE 8 MCG: 4 INJECTION, SOLUTION INTRAVENOUS at 09:56

## 2024-11-04 RX ADMIN — PROPOFOL 20 MG: 10 INJECTION, EMULSION INTRAVENOUS at 09:54

## 2024-11-04 ASSESSMENT — ACTIVITIES OF DAILY LIVING (ADL): ADLS_ACUITY_SCORE: 0

## 2024-11-04 NOTE — ANESTHESIA PREPROCEDURE EVALUATION
Anesthesia Pre-Procedure Evaluation    Patient: Olayinka Baltazar   MRN: [id number] : 1950        Procedure : Procedure(s):  esophagogastroduodenoscopy          Past Medical History:   Diagnosis Date    Gastroesophageal reflux disease     Malignant neoplasm (H)     oral    Tremors       Past Surgical History:   Procedure Laterality Date    ESOPHAGOSCOPY, GASTROSCOPY, DUODENOSCOPY (EGD), COMBINED N/A 2015    Procedure: COMBINED ESOPHAGOSCOPY, GASTROSCOPY, DUODENOSCOPY (EGD), BIOPSY SINGLE OR MULTIPLE;  Surgeon: Celestino Shannon MD;  Location: [facility] GI    ESOPHAGOSCOPY, GASTROSCOPY, DUODENOSCOPY (EGD), COMBINED  2019    Dr. Mccrary [facility]    ESOPHAGOSCOPY, GASTROSCOPY, DUODENOSCOPY (EGD), COMBINED N/A 2019    Procedure: ESOPHAGOGASTRODUODENOSCOPY, WITH BIOPSY;  Surgeon: Prem Baumann MD;  Location: [facility] GI    ESOPHAGOSCOPY, GASTROSCOPY, DUODENOSCOPY (EGD), COMBINED N/A 2024    Procedure: Esophagogastroduodenoscopy with biopsies;  Surgeon: Morro Landry MD;  Location: [facility] OR    ESOPHAGOSCOPY, GASTROSCOPY, DUODENOSCOPY (EGD), COMBINED N/A 2024    Procedure: Endoscopic ultrasound upper gastrointestinal tract with fine needle aspiration;  Surgeon: Morro Landry MD;  Location: [facility] OR    MOUTH SURGERY      removal mouth ca    ORTHOPEDIC SURGERY      SPLENECTOMY  1966    TONSILLECTOMY  1979      Allergies   Allergen Reactions    [place] [Nuts] Anaphylaxis      Social History     Tobacco Use    Smoking status: Every Day     Current packs/day: 0.50     Average packs/day: 0.5 packs/day for 40.0 years (20.0 ttl pk-yrs)     Types: Cigarettes    Smokeless tobacco: Never   Substance Use Topics    Alcohol use: No     Comment: 3/9/2009 - quit      Wt Readings from Last 1 Encounters:   24 86 kg (189 lb 9.5 oz)        Anesthesia Evaluation            ROS/MED HX  ENT/Pulmonary:     (+) sleep apnea, mild, doesn't use CPAP,                                     "  Neurologic:       Cardiovascular:       METS/Exercise Tolerance:     Hematologic:       Musculoskeletal:       GI/Hepatic:     (+) GERD, Asymptomatic on medication,                  Renal/Genitourinary:       Endo:       Psychiatric/Substance Use:       Infectious Disease:       Malignancy:   (+) Malignancy, History of GI.    Other:            Physical Exam    Airway        Mallampati: II   TM distance: > 3 FB   Neck ROM: full   Mouth opening: > 3 cm    Respiratory Devices and Support         Dental       (+) Minor Abnormalities - some fillings, tiny chips      Cardiovascular   cardiovascular exam normal          Pulmonary   pulmonary exam normal                OUTSIDE LABS:  CBC:   Lab Results   Component Value Date    WBC 2.4 (L) 09/03/2024    WBC 7.8 02/06/2019    HGB 14.3 09/03/2024    HGB 14.2 02/06/2019    HCT 43.5 09/03/2024    HCT 43.4 02/06/2019     09/03/2024     02/06/2019     BMP:   Lab Results   Component Value Date     09/03/2024     02/06/2019    POTASSIUM 4.1 09/03/2024    POTASSIUM 3.9 02/06/2019    CHLORIDE 104 09/03/2024    CHLORIDE 110 (H) 02/06/2019    CO2 21 (L) 09/03/2024    CO2 29 02/06/2019    BUN 16.7 09/03/2024    BUN 20 02/06/2019    CR 0.92 09/03/2024    CR 0.90 02/06/2019     (H) 09/03/2024    GLC 96 02/06/2019     COAGS: No results found for: \"PTT\", \"INR\", \"FIBR\"  POC: No results found for: \"BGM\", \"HCG\", \"HCGS\"  HEPATIC: No results found for: \"ALBUMIN\", \"PROTTOTAL\", \"ALT\", \"AST\", \"GGT\", \"ALKPHOS\", \"BILITOTAL\", \"BILIDIRECT\", \"KATIE\"  OTHER:   Lab Results   Component Value Date    MARIAJOSE 9.0 09/03/2024       Anesthesia Plan    ASA Status:  2       Anesthesia Type: MAC.              Consents    Anesthesia Plan(s) and associated risks, benefits, and realistic alternatives discussed. Questions answered and patient/representative(s) expressed understanding.     - Discussed:     - Discussed with:  Patient            Postoperative Care    Pain management: Oral " pain medications, IV analgesics.   PONV prophylaxis: Ondansetron (or other 5HT-3)     Comments:               Luna St I have reviewed the pertinent notes and labs in the chart from the past 30 days and (re)examined the patient.  Any updates or changes from those notes are reflected in this note.             # Drug Induced Platelet Defect: home medication list includes an antiplatelet medication

## 2024-11-04 NOTE — ANESTHESIA POSTPROCEDURE EVALUATION
Patient: Olayinka Baltazar    Procedure: Procedure(s):  esophagogastroduodenoscopy       Anesthesia Type:  MAC    Note:  Disposition: Outpatient   Postop Pain Control: Uneventful            Sign Out: Well controlled pain   PONV: No   Neuro/Psych: Uneventful            Sign Out: Acceptable/Baseline neuro status   Airway/Respiratory: Uneventful            Sign Out: Acceptable/Baseline resp. status   CV/Hemodynamics: Uneventful            Sign Out: Acceptable CV status; No obvious hypovolemia; No obvious fluid overload   Other NRE: NONE   DID A NON-ROUTINE EVENT OCCUR?            Last vitals:  Vitals Value Taken Time   /68 11/04/24 1040   Temp     Pulse 65 11/04/24 1046   Resp 32 11/04/24 1047   SpO2 92 % 11/04/24 1048   Vitals shown include unfiled device data.    Electronically Signed By: Luna St  November 4, 2024  11:14 AM

## 2024-11-04 NOTE — ANESTHESIA CARE TRANSFER NOTE
Patient: Olayinka Baltazar    Procedure: Procedure(s):  esophagogastroduodenoscopy       Diagnosis: Malignant neoplasm of overlapping sites of esophagus (H) [C15.8]  Diagnosis Additional Information: No value filed.    Anesthesia Type:   MAC     Note:    Oropharynx: oropharynx clear of all foreign objects and spontaneously breathing  Level of Consciousness: awake  Oxygen Supplementation: blow-by O2  Level of Supplemental Oxygen (L/min / FiO2): 5  Independent Airway: airway patency satisfactory and stable  Dentition: dentition unchanged  Vital Signs Stable: post-procedure vital signs reviewed and stable  Report to RN Given: handoff report given  Destination: endo recovery.          Vitals:  Vitals Value Taken Time   BP 90/66 11/04/24 1011        Pulse 73 11/04/24 1014   Resp 16 11/04/24 1014   SpO2 96 % 11/04/24 1014     Electronically Signed By: ASHANTI Salas CRNA  November 4, 2024  10:16 AM

## 2024-11-05 LAB
PATH REPORT.COMMENTS IMP SPEC: NORMAL
PATH REPORT.FINAL DX SPEC: NORMAL
PATH REPORT.GROSS SPEC: NORMAL
PATH REPORT.MICROSCOPIC SPEC OTHER STN: NORMAL
PATH REPORT.RELEVANT HX SPEC: NORMAL
PHOTO IMAGE: NORMAL

## 2024-11-05 PROCEDURE — 88305 TISSUE EXAM BY PATHOLOGIST: CPT | Mod: 26 | Performed by: PATHOLOGY

## 2025-06-02 ENCOUNTER — TELEPHONE (OUTPATIENT)
Dept: INTERVENTIONAL RADIOLOGY/VASCULAR | Facility: CLINIC | Age: 75
End: 2025-06-02
Payer: COMMERCIAL

## 2025-06-02 RX ORDER — ACETAMINOPHEN 325 MG/1
650 TABLET ORAL
OUTPATIENT
Start: 2025-06-02

## 2025-06-02 NOTE — TELEPHONE ENCOUNTER
INTERVENTIONAL RADIOLOGY INSTRUCTIONS     You are scheduled for an upcoming procedure   in the   Interventional Radiology Department at   [facility].     Date: Tuesday Valery 3      Procedure: PORT Placement     Address: [facility]                 [address] [place]                 [place] - [address]        Check into the Interventional Radiology Department at: 07:00 am      On the date of procedure, please do not eat any food after: 11:00 pm TONIGHT (8 hours before arrival time)    On the date of this procedure, you may drink clear liquids until 05:00 am (2 hours before arrival time)     Clear liquid examples are: water, apple juice, black coffee or tea (no cream, sugar or milk), Gatorade & Jello.           We suggest wearing loose, comfortable clothing.     You will need to have someone available to drive you home.     A ride share service ([employer], [employer], [employer]) does not qualify for transportation home unless you have another adult accompanying you home.    We recommend that you do not not drive, or operate heavy machinery for 24 hours after receiving sedation for this procedure.    We recommend a responsible adult stay with you for 6 hours after discharge to home.    Please bring a list of your current medications.    Morning medications may be taken on the day of your procedure with sips of clear fluids (please do not eat).  Please do not take your diabetic medications.    This requirement has been fulfilled by your visit with provider: CASIMIRO on 5/30.  No further action needed!  Please plan for 2-3 hours spent in the Interventional Radiology Department.  This will include:  Pre-procedure Time  Procedure Time  Recovery Time.      Please confirm that you have received these instructions by replying to this [employer] message, or if you have any questions, please call the Interventional Radiology team at [phone].         Thank you!    Fabby  P.  Interventional Radiology Intake Nurse Coordinator

## 2025-06-02 NOTE — TELEPHONE ENCOUNTER
Writer has spoken with Olayinka regarding planned procedure with IR via telephone.      Olayinka acknowledges understanding of pre-procedure instructions.         I have provided Olayinka with IR number  for questions or concerns.    A documented H&P exam is noted in patient EMR with the date 5/30.  Provider name FERN VARMA RN, BSN  Interventional Radiology

## 2025-06-03 ENCOUNTER — APPOINTMENT (OUTPATIENT)
Dept: INTERVENTIONAL RADIOLOGY/VASCULAR | Facility: CLINIC | Age: 75
End: 2025-06-03
Attending: INTERNAL MEDICINE
Payer: COMMERCIAL

## 2025-06-03 ENCOUNTER — HOSPITAL ENCOUNTER (OUTPATIENT)
Facility: CLINIC | Age: 75
Discharge: HOME | End: 2025-06-03
Attending: RADIOLOGY | Admitting: RADIOLOGY
Payer: COMMERCIAL

## 2025-06-03 VITALS
TEMPERATURE: 96.9 F | OXYGEN SATURATION: 92 % | RESPIRATION RATE: 21 BRPM | SYSTOLIC BLOOD PRESSURE: 123 MMHG | DIASTOLIC BLOOD PRESSURE: 72 MMHG | HEART RATE: 78 BPM

## 2025-06-03 DIAGNOSIS — C15.9: ICD-10-CM

## 2025-06-03 PROCEDURE — 250N000011 HC RX IP 250 OP 636: Mod: JZ | Performed by: PHYSICIAN ASSISTANT

## 2025-06-03 PROCEDURE — 76937 US GUIDE VASCULAR ACCESS: CPT

## 2025-06-03 PROCEDURE — 250N000009 HC RX 250: Performed by: PHYSICIAN ASSISTANT

## 2025-06-03 PROCEDURE — C1894 INTRO/SHEATH, NON-LASER: HCPCS

## 2025-06-03 PROCEDURE — C1788 PORT, INDWELLING, IMP: HCPCS

## 2025-06-03 RX ORDER — NALOXONE HYDROCHLORIDE 0.4 MG/ML
0.2 INJECTION, SOLUTION INTRAMUSCULAR; INTRAVENOUS; SUBCUTANEOUS
Status: DISCONTINUED | OUTPATIENT
Start: 2025-06-03 | End: 2025-06-03 | Stop reason: HOSPADM

## 2025-06-03 RX ORDER — CEFAZOLIN SODIUM/WATER 2 G/20 ML
2 SYRINGE (ML) INTRAVENOUS
Status: COMPLETED | OUTPATIENT
Start: 2025-06-03 | End: 2025-06-03

## 2025-06-03 RX ORDER — HEPARIN SODIUM (PORCINE) LOCK FLUSH IV SOLN 100 UNIT/ML 100 UNIT/ML
5 SOLUTION INTRAVENOUS ONCE
Status: COMPLETED | OUTPATIENT
Start: 2025-06-03 | End: 2025-06-03

## 2025-06-03 RX ORDER — FLUMAZENIL 0.1 MG/ML
0.2 INJECTION, SOLUTION INTRAVENOUS
Status: DISCONTINUED | OUTPATIENT
Start: 2025-06-03 | End: 2025-06-03 | Stop reason: HOSPADM

## 2025-06-03 RX ORDER — NALOXONE HYDROCHLORIDE 0.4 MG/ML
0.4 INJECTION, SOLUTION INTRAMUSCULAR; INTRAVENOUS; SUBCUTANEOUS
Status: DISCONTINUED | OUTPATIENT
Start: 2025-06-03 | End: 2025-06-03 | Stop reason: HOSPADM

## 2025-06-03 RX ORDER — LIDOCAINE HYDROCHLORIDE AND EPINEPHRINE 10; 10 MG/ML; UG/ML
0-20 INJECTION, SOLUTION INFILTRATION; PERINEURAL ONCE
Status: COMPLETED | OUTPATIENT
Start: 2025-06-03 | End: 2025-06-03

## 2025-06-03 RX ORDER — ONDANSETRON 2 MG/ML
4 INJECTION INTRAMUSCULAR; INTRAVENOUS
Status: DISCONTINUED | OUTPATIENT
Start: 2025-06-03 | End: 2025-06-03 | Stop reason: HOSPADM

## 2025-06-03 RX ORDER — FENTANYL CITRATE 50 UG/ML
25-50 INJECTION, SOLUTION INTRAMUSCULAR; INTRAVENOUS EVERY 5 MIN PRN
Status: DISCONTINUED | OUTPATIENT
Start: 2025-06-03 | End: 2025-06-03 | Stop reason: HOSPADM

## 2025-06-03 RX ADMIN — FENTANYL CITRATE 50 MCG: 50 INJECTION, SOLUTION INTRAMUSCULAR; INTRAVENOUS at 08:20

## 2025-06-03 RX ADMIN — HEPARIN SODIUM (PORCINE) LOCK FLUSH IV SOLN 100 UNIT/ML 5 ML: 100 SOLUTION at 08:32

## 2025-06-03 RX ADMIN — FENTANYL CITRATE 50 MCG: 50 INJECTION, SOLUTION INTRAMUSCULAR; INTRAVENOUS at 08:16

## 2025-06-03 RX ADMIN — Medication 2 G: at 08:14

## 2025-06-03 RX ADMIN — MIDAZOLAM 1 MG: 1 INJECTION INTRAMUSCULAR; INTRAVENOUS at 08:20

## 2025-06-03 RX ADMIN — MIDAZOLAM 1 MG: 1 INJECTION INTRAMUSCULAR; INTRAVENOUS at 08:16

## 2025-06-03 RX ADMIN — LIDOCAINE HYDROCHLORIDE 8 ML: 10 INJECTION, SOLUTION EPIDURAL; INFILTRATION; INTRACAUDAL; PERINEURAL at 08:34

## 2025-06-03 RX ADMIN — LIDOCAINE HYDROCHLORIDE AND EPINEPHRINE 8 ML: 10; 10 INJECTION, SOLUTION INFILTRATION; PERINEURAL at 08:33

## 2025-06-03 ASSESSMENT — ACTIVITIES OF DAILY LIVING (ADL)
ADLS_ACUITY_SCORE: 47

## 2025-06-03 NOTE — PRE-PROCEDURE
GENERAL PRE-PROCEDURE:   Date/Time:  6/3/2025 8:12 AM    Written consent obtained?: Yes    Risks and benefits: Risks, benefits and alternatives were discussed    Consent given by:  Patient  Patient states understanding of procedure being performed: Yes    Patient's understanding of procedure matches consent: Yes    Procedure consent matches procedure scheduled: Yes    Expected level of sedation:  Moderate  Appropriately NPO:  Yes  ASA Class:  2  Mallampati  :  Grade 1- soft palate, uvula, tonsillar pillars, and posterior pharyngeal wall visible  Lungs:  Lungs clear with good breath sounds bilaterally  Heart:  Normal heart sounds and rate  History & Physical reviewed:  History and physical reviewed and no updates needed  Statement of review:  I have reviewed the lab findings, diagnostic data, medications, and the plan for sedation

## 2025-06-03 NOTE — PROGRESS NOTES
Patient tolerated right port port placement well. Patient vitals signs are stable post procedure. Patient able to tolerate PO intake. Port site is clean, dry and intact. Patient is able to ambulate independently. PIV removed at time of discharge. Patient denying pain. Discharge instructions reviewed with patient and granddaughter. Patient discharged in care of granddaughter.

## 2025-06-03 NOTE — PROGRESS NOTES
Procedure port placement  Versed 2 mg  Fentanyl 100 mcg   Sedation time 15 min  Other meds 2 gm ancef  1% Lidocaine 8 ml  1% Lido with Epi 8 ml  Fluoro time 0.5 min  AK 2 mGy

## 2025-06-03 NOTE — CARE PLAN
Post Procedure Summary:  Prior to the start of the procedure and with procedural staff participation, I verbally confirmed the patient s identity using two indicators, relevant allergies, that the procedure was appropriate and matched the consent or emergent situation, and that the correct equipment/implants were available. Immediately prior to starting the procedure I conducted the Time Out with the procedural staff and re-confirmed the patient s name, procedure, and site/side. ([facility] universal protocol was followed.)  Yes                                        Sedatives: 100 mcg Fentanyl and 2 g Midazolam (Versed)     Vital signs, airway and pulse oximetry were monitored and remained stable throughout the procedure and sedation was maintained until the procedure was complete.  The patient was monitored by staff until sedation discharge criteria were met.     Patient tolerance: Patient tolerated the procedure well with no immediate complications.     Time of sedation in minutes: 15 Minutes minutes from beginning to end of physician one to one monitoring

## 2025-06-03 NOTE — DISCHARGE INSTRUCTIONS
Port Placement Procedure Discharge Instructions:  You had a port placed. A port is a small medical device that is placed under the skin and is connected to a vein with a catheter (thin, flexible tube). Ports can be used to administer IV medications (including chemotherapy), fluids or blood products or for blood lab draws. Please follow the below instructions after your procedure:    Care Instructions:  - If you received sedation for your procedure, do not drive or operate heavy machinery for the rest of the day.  - You may shower beginning tomorrow (post procedure day #1). Do not scrub site until well healed, pat dry gently with a towel.  - You likely have skin adhesive over your port site. Skin adhesive works like a bandage to keep the site covered and protected. Do not use antibiotic ointment or creams/lotions over adhesive as it can break it down. The skin adhesive will peel off on its own (typically in 5-14 days).  - Avoid submerging the port site under water (ex: tub baths, lakes, hot tubs and pools) for 10 days or until your site is well healed.  - You may have some discomfort, minimal swelling, redness and/or bruising at your port site/procedure site. You may take over the counter pain medication for discomfort (follow the package directions) or apply an ice pack wrapped in a towel over the site (rotating 20 minutes with ice pack on and 20 minutes with ice pack off) for comfort as needed. It can take several days for these to resolve.  - Avoid heavy lifting (greater than 10 pounds) and strenuous activities for 2-3 days following your procedure.  - If you experience significant bleeding at site, apply pressure with hands above the clavicle bone, sit upright and seek immediate medical assistance.  - Ports need to be flushed approximately every 4 weeks, if not being used more frequently. Follow up with the provider who ordered your port placement for further instructions for this.    If you experience the  following seek medical evaluation:  - Uncontrolled bleeding from port site  - Fever (greater than 100 )  - Purulent (yellow/green/foul smelling) drainage from port insertion site  - Increasing pain at port site  - Increasing redness at port site  - Increasing swelling    INTERVENTIONAL RADIOLOGY DEPARTMENT  Procedure Physician: Dr. Rubio      Date of procedure: 6/3/25    [place] [facility] RN Line: [phone]     IF YOU ARE EXPERIENCING A MEDICAL EMERGENCY PLEASE CALL [phone]

## 2025-07-09 ENCOUNTER — TRANSFERRED RECORDS (OUTPATIENT)
Dept: HEALTH INFORMATION MANAGEMENT | Facility: CLINIC | Age: 75
End: 2025-07-09
Payer: COMMERCIAL

## 2025-07-09 ENCOUNTER — TELEPHONE (OUTPATIENT)
Dept: INTERVENTIONAL RADIOLOGY/VASCULAR | Facility: CLINIC | Age: 75
End: 2025-07-09
Payer: COMMERCIAL

## 2025-07-11 ENCOUNTER — HOSPITAL ENCOUNTER (OUTPATIENT)
Facility: CLINIC | Age: 75
Discharge: HOME | End: 2025-07-11
Attending: RADIOLOGY | Admitting: RADIOLOGY
Payer: COMMERCIAL

## 2025-07-11 ENCOUNTER — APPOINTMENT (OUTPATIENT)
Dept: INTERVENTIONAL RADIOLOGY/VASCULAR | Facility: CLINIC | Age: 75
End: 2025-07-11
Attending: INTERNAL MEDICINE
Payer: COMMERCIAL

## 2025-07-11 VITALS
DIASTOLIC BLOOD PRESSURE: 65 MMHG | RESPIRATION RATE: 18 BRPM | SYSTOLIC BLOOD PRESSURE: 105 MMHG | HEART RATE: 81 BPM | OXYGEN SATURATION: 92 % | TEMPERATURE: 97.9 F

## 2025-07-11 DIAGNOSIS — Z48.00 DRESSING CHANGE: Primary | ICD-10-CM

## 2025-07-11 DIAGNOSIS — C15.3: ICD-10-CM

## 2025-07-11 LAB
ERYTHROCYTE [DISTWIDTH] IN BLOOD BY AUTOMATED COUNT: 14.4 % (ref 10–15)
HCT VFR BLD AUTO: 36 % (ref 40–53)
HGB BLD-MCNC: 11.8 G/DL (ref 13.3–17.7)
INR PPP: 1.19 (ref 0.85–1.15)
MCH RBC QN AUTO: 31.7 PG (ref 26.5–33)
MCHC RBC AUTO-ENTMCNC: 32.8 G/DL (ref 31.5–36.5)
MCV RBC AUTO: 97 FL (ref 78–100)
PLATELET # BLD AUTO: 305 10E3/UL (ref 150–450)
PROTHROMBIN TIME: 15.1 SECONDS (ref 11.8–14.8)
RBC # BLD AUTO: 3.72 10E6/UL (ref 4.4–5.9)
WBC # BLD AUTO: 7.3 10E3/UL (ref 4–11)

## 2025-07-11 PROCEDURE — 272N000001 HC OR GENERAL SUPPLY STERILE

## 2025-07-11 PROCEDURE — 250N000011 HC RX IP 250 OP 636: Performed by: NURSE PRACTITIONER

## 2025-07-11 PROCEDURE — 36591 DRAW BLOOD OFF VENOUS DEVICE: CPT | Performed by: NURSE PRACTITIONER

## 2025-07-11 PROCEDURE — 255N000002 HC RX 255 OP 636: Performed by: RADIOLOGY

## 2025-07-11 PROCEDURE — 85014 HEMATOCRIT: CPT | Performed by: NURSE PRACTITIONER

## 2025-07-11 PROCEDURE — 85610 PROTHROMBIN TIME: CPT | Performed by: NURSE PRACTITIONER

## 2025-07-11 PROCEDURE — C1769 GUIDE WIRE: HCPCS

## 2025-07-11 PROCEDURE — 250N000009 HC RX 250: Performed by: RADIOLOGY

## 2025-07-11 PROCEDURE — 49440 PLACE GASTROSTOMY TUBE PERC: CPT

## 2025-07-11 RX ORDER — NALOXONE HYDROCHLORIDE 0.4 MG/ML
0.4 INJECTION, SOLUTION INTRAMUSCULAR; INTRAVENOUS; SUBCUTANEOUS
Status: DISCONTINUED | OUTPATIENT
Start: 2025-07-11 | End: 2025-07-11 | Stop reason: HOSPADM

## 2025-07-11 RX ORDER — NALOXONE HYDROCHLORIDE 0.4 MG/ML
0.2 INJECTION, SOLUTION INTRAMUSCULAR; INTRAVENOUS; SUBCUTANEOUS
Status: DISCONTINUED | OUTPATIENT
Start: 2025-07-11 | End: 2025-07-11 | Stop reason: HOSPADM

## 2025-07-11 RX ORDER — IODIXANOL 320 MG/ML
50 INJECTION, SOLUTION INTRAVASCULAR ONCE
Status: COMPLETED | OUTPATIENT
Start: 2025-07-11 | End: 2025-07-11

## 2025-07-11 RX ORDER — HEPARIN SODIUM (PORCINE) LOCK FLUSH IV SOLN 100 UNIT/ML 100 UNIT/ML
5-10 SOLUTION INTRAVENOUS
Status: DISCONTINUED | OUTPATIENT
Start: 2025-07-11 | End: 2025-07-11 | Stop reason: HOSPADM

## 2025-07-11 RX ORDER — FLUMAZENIL 0.1 MG/ML
0.2 INJECTION, SOLUTION INTRAVENOUS
Status: DISCONTINUED | OUTPATIENT
Start: 2025-07-11 | End: 2025-07-11 | Stop reason: HOSPADM

## 2025-07-11 RX ORDER — CEFAZOLIN SODIUM 2 G/50ML
2 SOLUTION INTRAVENOUS
Status: COMPLETED | OUTPATIENT
Start: 2025-07-11 | End: 2025-07-11

## 2025-07-11 RX ORDER — HEPARIN SODIUM,PORCINE 10 UNIT/ML
5-10 VIAL (ML) INTRAVENOUS
Status: DISCONTINUED | OUTPATIENT
Start: 2025-07-11 | End: 2025-07-11 | Stop reason: HOSPADM

## 2025-07-11 RX ORDER — HEPARIN SODIUM,PORCINE 10 UNIT/ML
5-10 VIAL (ML) INTRAVENOUS EVERY 24 HOURS
Status: DISCONTINUED | OUTPATIENT
Start: 2025-07-11 | End: 2025-07-11 | Stop reason: HOSPADM

## 2025-07-11 RX ORDER — FENTANYL CITRATE 50 UG/ML
25-50 INJECTION, SOLUTION INTRAMUSCULAR; INTRAVENOUS EVERY 5 MIN PRN
Status: DISCONTINUED | OUTPATIENT
Start: 2025-07-11 | End: 2025-07-11 | Stop reason: HOSPADM

## 2025-07-11 RX ADMIN — FENTANYL CITRATE 50 MCG: 50 INJECTION, SOLUTION INTRAMUSCULAR; INTRAVENOUS at 09:14

## 2025-07-11 RX ADMIN — LIDOCAINE HYDROCHLORIDE 28 ML: 10 INJECTION, SOLUTION INFILTRATION; PERINEURAL at 09:30

## 2025-07-11 RX ADMIN — CEFAZOLIN SODIUM 2 G: 2 SOLUTION INTRAVENOUS at 09:06

## 2025-07-11 RX ADMIN — HEPARIN SODIUM (PORCINE) LOCK FLUSH IV SOLN 100 UNIT/ML 5 ML: 100 SOLUTION at 09:35

## 2025-07-11 RX ADMIN — IODIXANOL 50 ML: 320 INJECTION, SOLUTION INTRAVASCULAR at 09:31

## 2025-07-11 RX ADMIN — MIDAZOLAM 1 MG: 1 INJECTION INTRAMUSCULAR; INTRAVENOUS at 09:14

## 2025-07-11 ASSESSMENT — ACTIVITIES OF DAILY LIVING (ADL)
ADLS_ACUITY_SCORE: 47

## 2025-07-11 NOTE — PROGRESS NOTES
Procedure G-tube placement  Versed 1 mg  Fentanyl 50 mcg   Sedation time 20 min  Contrast 50 ml Visipaque 320  Other meds Ancef 2g  1% Lidocaine 28 ml    Fluoro time 2.2 min  AK 19 mGy

## 2025-07-11 NOTE — IR NOTE
Interventional Radiology Pre-Procedure Sedation Assessment   Time of Assessment: 8:49 AM    Expected Level: Moderate Sedation    Indication: Sedation is required for the following type of Procedure: GI    Sedation and procedural consent: Risks, benefits and alternatives were discussed with Patient    PO Intake: Appropriately NPO for procedure    ASA Class: Class 3 - SEVERE SYSTEMIC DISEASE, DEFINITE FUNCTIONAL LIMITATIONS.    Mallampati: Grade 4:  Soft palate obscured by base of tongue    Lungs: Lungs Clear with good breath sounds bilaterally    Heart: Normal heart sounds and rate    History and physical reviewed and no updates needed. I have reviewed the lab findings, diagnostic data, medications, and the plan for sedation. I have determined this patient to be an appropriate candidate for the planned sedation and procedure and have reassessed the patient IMMEDIATELY PRIOR to sedation and procedure.    Cy Breaux MD

## 2025-07-11 NOTE — CARE PLAN
A&O x 4. Dressing CDI. VSS. Tolerated PO. Denies pain. Port remains in place heparin locked due to pt leaving straight for IV infusion. Pt arrived with port accessed. Belongings returned to pt  Pt and wife received and verbalized understanding of discharge insructions  Pt stable and ready for discharge  Pt taking out via hospital w/c- wife providing transport

## 2025-07-21 ENCOUNTER — HOSPITAL ENCOUNTER (OUTPATIENT)
Dept: GENERAL RADIOLOGY | Facility: CLINIC | Age: 75
Discharge: HOME | End: 2025-07-21
Attending: RADIOLOGY
Payer: COMMERCIAL

## 2025-07-21 NOTE — PROGRESS NOTES
Gastrostomy tube button sutures removed without difficulty. 10 minutes nursing time spent with patient. Discharged to home ambulatory with spouse in stable condition.

## 2025-08-25 ENCOUNTER — HOSPITAL ENCOUNTER (OUTPATIENT)
Dept: GENERAL RADIOLOGY | Facility: CLINIC | Age: 75
Discharge: HOME | End: 2025-08-25
Attending: RADIOLOGY
Payer: COMMERCIAL

## 2025-08-25 ENCOUNTER — HOSPITAL ENCOUNTER (OUTPATIENT)
Dept: CT IMAGING | Facility: CLINIC | Age: 75
Discharge: HOME | End: 2025-08-25
Attending: RADIOLOGY
Payer: COMMERCIAL

## 2025-08-25 DIAGNOSIS — R11.0 NAUSEA: ICD-10-CM

## 2025-08-25 PROCEDURE — 74150 CT ABDOMEN W/O CONTRAST: CPT

## (undated) DEVICE — Device

## (undated) DEVICE — ENDO NDL ASPIRATION ULTRASOUND 25GA ACQUIRE M00555560

## (undated) DEVICE — ENDO PROBE COVER ULTRASOUND BALLOON LATEX  MAJ-249

## (undated) DEVICE — KIT ENDO TURNOVER/PROCEDURE W/CLEAN A SCOPE LINERS 103888

## (undated) DEVICE — ENDO PROBE COVER ULTRASOUND BALLOON LATEX  MAJ-233

## (undated) DEVICE — TUBING SUCTION MEDI-VAC SOFT 3/16"X20' N520A

## (undated) DEVICE — SOL WATER IRRIG 1000ML BOTTLE 2F7114

## (undated) DEVICE — MANIFOLD NEPTUNE 4 PORT 700-20

## (undated) DEVICE — BAG CLEAR TRASH 1.3M 39X33" P4040C

## (undated) DEVICE — ENDO BITE BLOCK ADULT OLYMPUS LATEX FREE MAJ-1632

## (undated) DEVICE — ENDO FORCEP ENDOJAW BIOPSY 2.8MMX230CM FB-220U

## (undated) DEVICE — ENDO FORCEP ENDOJAW BIOPSY 2.0MMX155CM FB-221K

## (undated) RX ORDER — CEFAZOLIN SODIUM 2 G/50ML
SOLUTION INTRAVENOUS
Status: DISPENSED
Start: 2025-06-03

## (undated) RX ORDER — LIDOCAINE HYDROCHLORIDE 10 MG/ML
INJECTION, SOLUTION EPIDURAL; INFILTRATION; INTRACAUDAL; PERINEURAL
Status: DISPENSED
Start: 2025-07-11

## (undated) RX ORDER — LIDOCAINE HYDROCHLORIDE 10 MG/ML
INJECTION, SOLUTION EPIDURAL; INFILTRATION; INTRACAUDAL; PERINEURAL
Status: DISPENSED
Start: 2024-08-12

## (undated) RX ORDER — CEFAZOLIN SODIUM 2 G/50ML
SOLUTION INTRAVENOUS
Status: DISPENSED
Start: 2025-07-11

## (undated) RX ORDER — LIDOCAINE HYDROCHLORIDE 20 MG/ML
JELLY TOPICAL
Status: DISPENSED
Start: 2025-07-11

## (undated) RX ORDER — ONDANSETRON 2 MG/ML
INJECTION INTRAMUSCULAR; INTRAVENOUS
Status: DISPENSED
Start: 2024-08-12

## (undated) RX ORDER — HEPARIN SODIUM (PORCINE) LOCK FLUSH IV SOLN 100 UNIT/ML 100 UNIT/ML
SOLUTION INTRAVENOUS
Status: DISPENSED
Start: 2025-06-03

## (undated) RX ORDER — FENTANYL CITRATE 50 UG/ML
INJECTION, SOLUTION INTRAMUSCULAR; INTRAVENOUS
Status: DISPENSED
Start: 2025-06-03

## (undated) RX ORDER — FENTANYL CITRATE 50 UG/ML
INJECTION, SOLUTION INTRAMUSCULAR; INTRAVENOUS
Status: DISPENSED
Start: 2025-07-11

## (undated) RX ORDER — LIDOCAINE HYDROCHLORIDE AND EPINEPHRINE 10; 10 MG/ML; UG/ML
INJECTION, SOLUTION INFILTRATION; PERINEURAL
Status: DISPENSED
Start: 2025-06-03

## (undated) RX ORDER — PROPOFOL 10 MG/ML
INJECTION, EMULSION INTRAVENOUS
Status: DISPENSED
Start: 2024-08-12

## (undated) RX ORDER — FENTANYL CITRATE 50 UG/ML
INJECTION, SOLUTION INTRAMUSCULAR; INTRAVENOUS
Status: DISPENSED
Start: 2019-12-13

## (undated) RX ORDER — HEPARIN SODIUM (PORCINE) LOCK FLUSH IV SOLN 100 UNIT/ML 100 UNIT/ML
SOLUTION INTRAVENOUS
Status: DISPENSED
Start: 2025-07-11

## (undated) RX ORDER — LIDOCAINE HYDROCHLORIDE 10 MG/ML
INJECTION, SOLUTION EPIDURAL; INFILTRATION; INTRACAUDAL; PERINEURAL
Status: DISPENSED
Start: 2025-06-03